# Patient Record
Sex: FEMALE | Race: WHITE | NOT HISPANIC OR LATINO
[De-identification: names, ages, dates, MRNs, and addresses within clinical notes are randomized per-mention and may not be internally consistent; named-entity substitution may affect disease eponyms.]

---

## 2017-02-02 ENCOUNTER — APPOINTMENT (OUTPATIENT)
Dept: ANTEPARTUM | Facility: CLINIC | Age: 37
End: 2017-02-02

## 2017-02-02 ENCOUNTER — ASOB RESULT (OUTPATIENT)
Age: 37
End: 2017-02-02

## 2017-06-07 ENCOUNTER — INPATIENT (INPATIENT)
Facility: HOSPITAL | Age: 37
LOS: 2 days | Discharge: HOME | End: 2017-06-10
Payer: COMMERCIAL

## 2017-06-07 DIAGNOSIS — Z3A.00 WEEKS OF GESTATION OF PREGNANCY NOT SPECIFIED: ICD-10-CM

## 2017-06-07 DIAGNOSIS — O26.899 OTHER SPECIFIED PREGNANCY RELATED CONDITIONS, UNSPECIFIED TRIMESTER: ICD-10-CM

## 2017-06-08 VITALS — HEIGHT: 65 IN | WEIGHT: 213.85 LBS

## 2017-06-08 DIAGNOSIS — Z34.80 ENCOUNTER FOR SUPERVISION OF OTHER NORMAL PREGNANCY, UNSPECIFIED TRIMESTER: ICD-10-CM

## 2017-06-08 LAB
ALBUMIN SERPL ELPH-MCNC: 2.9 G/DL — LOW (ref 3.3–5)
ALP SERPL-CCNC: 95 U/L — SIGNIFICANT CHANGE UP (ref 40–120)
ALT FLD-CCNC: 14 U/L RC — SIGNIFICANT CHANGE UP (ref 10–45)
ANION GAP SERPL CALC-SCNC: 18 MMOL/L — HIGH (ref 5–17)
APTT BLD: 23.4 SEC — LOW (ref 27.5–37.4)
AST SERPL-CCNC: 26 U/L — SIGNIFICANT CHANGE UP (ref 10–40)
BASOPHILS # BLD AUTO: 0 K/UL — SIGNIFICANT CHANGE UP (ref 0–0.2)
BASOPHILS # BLD AUTO: 0 K/UL — SIGNIFICANT CHANGE UP (ref 0–0.2)
BASOPHILS NFR BLD AUTO: 0.1 % — SIGNIFICANT CHANGE UP (ref 0–2)
BILIRUB SERPL-MCNC: 0.2 MG/DL — SIGNIFICANT CHANGE UP (ref 0.2–1.2)
BLD GP AB SCN SERPL QL: NEGATIVE — SIGNIFICANT CHANGE UP
BUN SERPL-MCNC: 7 MG/DL — SIGNIFICANT CHANGE UP (ref 7–23)
CALCIUM SERPL-MCNC: 8.4 MG/DL — SIGNIFICANT CHANGE UP (ref 8.4–10.5)
CHLORIDE SERPL-SCNC: 105 MMOL/L — SIGNIFICANT CHANGE UP (ref 96–108)
CO2 SERPL-SCNC: 16 MMOL/L — LOW (ref 22–31)
CREAT SERPL-MCNC: 0.68 MG/DL — SIGNIFICANT CHANGE UP (ref 0.5–1.3)
EOSINOPHIL # BLD AUTO: 0 K/UL — SIGNIFICANT CHANGE UP (ref 0–0.5)
EOSINOPHIL # BLD AUTO: 0 K/UL — SIGNIFICANT CHANGE UP (ref 0–0.5)
EOSINOPHIL NFR BLD AUTO: 0.1 % — SIGNIFICANT CHANGE UP (ref 0–6)
FIBRINOGEN PPP-MCNC: 584 MG/DL — HIGH (ref 310–510)
GLUCOSE SERPL-MCNC: 168 MG/DL — HIGH (ref 70–99)
HCT VFR BLD CALC: 29.3 % — LOW (ref 34.5–45)
HCT VFR BLD CALC: 38.2 % — SIGNIFICANT CHANGE UP (ref 34.5–45)
HGB BLD-MCNC: 10.1 G/DL — LOW (ref 11.5–15.5)
HGB BLD-MCNC: 13.1 G/DL — SIGNIFICANT CHANGE UP (ref 11.5–15.5)
INR BLD: 1.02 RATIO — SIGNIFICANT CHANGE UP (ref 0.88–1.16)
LDH SERPL L TO P-CCNC: 208 U/L — SIGNIFICANT CHANGE UP (ref 50–242)
LYMPHOCYTES # BLD AUTO: 1.3 K/UL — SIGNIFICANT CHANGE UP (ref 1–3.3)
LYMPHOCYTES # BLD AUTO: 1.4 K/UL — SIGNIFICANT CHANGE UP (ref 1–3.3)
LYMPHOCYTES # BLD AUTO: 5 % — LOW (ref 13–44)
LYMPHOCYTES # BLD AUTO: 8.3 % — LOW (ref 13–44)
MCHC RBC-ENTMCNC: 32 PG — SIGNIFICANT CHANGE UP (ref 27–34)
MCHC RBC-ENTMCNC: 32.7 PG — SIGNIFICANT CHANGE UP (ref 27–34)
MCHC RBC-ENTMCNC: 34.2 GM/DL — SIGNIFICANT CHANGE UP (ref 32–36)
MCHC RBC-ENTMCNC: 34.5 GM/DL — SIGNIFICANT CHANGE UP (ref 32–36)
MCV RBC AUTO: 93.6 FL — SIGNIFICANT CHANGE UP (ref 80–100)
MCV RBC AUTO: 94.9 FL — SIGNIFICANT CHANGE UP (ref 80–100)
MONOCYTES # BLD AUTO: 0.7 K/UL — SIGNIFICANT CHANGE UP (ref 0–0.9)
MONOCYTES # BLD AUTO: 1.2 K/UL — HIGH (ref 0–0.9)
MONOCYTES NFR BLD AUTO: 4.5 % — SIGNIFICANT CHANGE UP (ref 2–14)
MONOCYTES NFR BLD AUTO: 9 % — SIGNIFICANT CHANGE UP (ref 2–14)
NEUTROPHILS # BLD AUTO: 14 K/UL — HIGH (ref 1.8–7.4)
NEUTROPHILS # BLD AUTO: 20.4 K/UL — HIGH (ref 1.8–7.4)
NEUTROPHILS NFR BLD AUTO: 86 % — HIGH (ref 43–77)
NEUTROPHILS NFR BLD AUTO: 87 % — HIGH (ref 43–77)
PLATELET # BLD AUTO: 151 K/UL — SIGNIFICANT CHANGE UP (ref 150–400)
PLATELET # BLD AUTO: 178 K/UL — SIGNIFICANT CHANGE UP (ref 150–400)
POTASSIUM SERPL-MCNC: 3.1 MMOL/L — LOW (ref 3.5–5.3)
POTASSIUM SERPL-SCNC: 3.1 MMOL/L — LOW (ref 3.5–5.3)
PROT SERPL-MCNC: 5.1 G/DL — LOW (ref 6–8.3)
PROTHROM AB SERPL-ACNC: 11.1 SEC — SIGNIFICANT CHANGE UP (ref 9.8–12.7)
RBC # BLD: 3.09 M/UL — LOW (ref 3.8–5.2)
RBC # BLD: 4.09 M/UL — SIGNIFICANT CHANGE UP (ref 3.8–5.2)
RBC # FLD: 11.8 % — SIGNIFICANT CHANGE UP (ref 10.3–14.5)
RBC # FLD: 12.1 % — SIGNIFICANT CHANGE UP (ref 10.3–14.5)
RH IG SCN BLD-IMP: POSITIVE — SIGNIFICANT CHANGE UP
RH IG SCN BLD-IMP: POSITIVE — SIGNIFICANT CHANGE UP
SODIUM SERPL-SCNC: 139 MMOL/L — SIGNIFICANT CHANGE UP (ref 135–145)
T PALLIDUM AB TITR SER: NEGATIVE — SIGNIFICANT CHANGE UP
URATE SERPL-MCNC: 4.9 MG/DL — SIGNIFICANT CHANGE UP (ref 2.5–7)
WBC # BLD: 16.1 K/UL — HIGH (ref 3.8–10.5)
WBC # BLD: 23.1 K/UL — HIGH (ref 3.8–10.5)
WBC # FLD AUTO: 16.1 K/UL — HIGH (ref 3.8–10.5)
WBC # FLD AUTO: 23.1 K/UL — HIGH (ref 3.8–10.5)

## 2017-06-08 RX ORDER — GLYCERIN ADULT
1 SUPPOSITORY, RECTAL RECTAL AT BEDTIME
Qty: 0 | Refills: 0 | Status: DISCONTINUED | OUTPATIENT
Start: 2017-06-08 | End: 2017-06-10

## 2017-06-08 RX ORDER — OXYTOCIN 10 UNIT/ML
4 VIAL (ML) INJECTION
Qty: 30 | Refills: 0 | Status: DISCONTINUED | OUTPATIENT
Start: 2017-06-08 | End: 2017-06-08

## 2017-06-08 RX ORDER — SODIUM CHLORIDE 9 MG/ML
1000 INJECTION, SOLUTION INTRAVENOUS
Qty: 0 | Refills: 0 | Status: DISCONTINUED | OUTPATIENT
Start: 2017-06-08 | End: 2017-06-08

## 2017-06-08 RX ORDER — LANOLIN
1 OINTMENT (GRAM) TOPICAL EVERY 6 HOURS
Qty: 0 | Refills: 0 | Status: DISCONTINUED | OUTPATIENT
Start: 2017-06-08 | End: 2017-06-10

## 2017-06-08 RX ORDER — OXYCODONE HYDROCHLORIDE 5 MG/1
5 TABLET ORAL EVERY 4 HOURS
Qty: 0 | Refills: 0 | Status: DISCONTINUED | OUTPATIENT
Start: 2017-06-08 | End: 2017-06-10

## 2017-06-08 RX ORDER — HYDROCORTISONE 1 %
1 OINTMENT (GRAM) TOPICAL EVERY 4 HOURS
Qty: 0 | Refills: 0 | Status: DISCONTINUED | OUTPATIENT
Start: 2017-06-08 | End: 2017-06-10

## 2017-06-08 RX ORDER — IBUPROFEN 200 MG
600 TABLET ORAL EVERY 6 HOURS
Qty: 0 | Refills: 0 | Status: COMPLETED | OUTPATIENT
Start: 2017-06-08 | End: 2018-05-07

## 2017-06-08 RX ORDER — ACETAMINOPHEN 500 MG
650 TABLET ORAL EVERY 6 HOURS
Qty: 0 | Refills: 0 | Status: DISCONTINUED | OUTPATIENT
Start: 2017-06-08 | End: 2017-06-10

## 2017-06-08 RX ORDER — IBUPROFEN 200 MG
600 TABLET ORAL EVERY 6 HOURS
Qty: 0 | Refills: 0 | Status: DISCONTINUED | OUTPATIENT
Start: 2017-06-08 | End: 2017-06-10

## 2017-06-08 RX ORDER — KETOROLAC TROMETHAMINE 30 MG/ML
30 SYRINGE (ML) INJECTION ONCE
Qty: 0 | Refills: 0 | Status: DISCONTINUED | OUTPATIENT
Start: 2017-06-08 | End: 2017-06-08

## 2017-06-08 RX ORDER — OXYTOCIN 10 UNIT/ML
41.67 VIAL (ML) INJECTION
Qty: 20 | Refills: 0 | Status: DISCONTINUED | OUTPATIENT
Start: 2017-06-08 | End: 2017-06-10

## 2017-06-08 RX ORDER — PRAMOXINE HYDROCHLORIDE 150 MG/15G
1 AEROSOL, FOAM RECTAL EVERY 4 HOURS
Qty: 0 | Refills: 0 | Status: DISCONTINUED | OUTPATIENT
Start: 2017-06-08 | End: 2017-06-10

## 2017-06-08 RX ORDER — OXYTOCIN 10 UNIT/ML
333.33 VIAL (ML) INJECTION
Qty: 20 | Refills: 0 | Status: DISCONTINUED | OUTPATIENT
Start: 2017-06-08 | End: 2017-06-08

## 2017-06-08 RX ORDER — DOCUSATE SODIUM 100 MG
100 CAPSULE ORAL
Qty: 0 | Refills: 0 | Status: DISCONTINUED | OUTPATIENT
Start: 2017-06-08 | End: 2017-06-10

## 2017-06-08 RX ORDER — DIBUCAINE 1 %
1 OINTMENT (GRAM) RECTAL EVERY 4 HOURS
Qty: 0 | Refills: 0 | Status: DISCONTINUED | OUTPATIENT
Start: 2017-06-08 | End: 2017-06-10

## 2017-06-08 RX ORDER — SODIUM CHLORIDE 9 MG/ML
500 INJECTION, SOLUTION INTRAVENOUS ONCE
Qty: 0 | Refills: 0 | Status: COMPLETED | OUTPATIENT
Start: 2017-06-08 | End: 2017-06-08

## 2017-06-08 RX ORDER — MAGNESIUM HYDROXIDE 400 MG/1
30 TABLET, CHEWABLE ORAL
Qty: 0 | Refills: 0 | Status: DISCONTINUED | OUTPATIENT
Start: 2017-06-08 | End: 2017-06-10

## 2017-06-08 RX ORDER — ACETAMINOPHEN 500 MG
650 TABLET ORAL EVERY 6 HOURS
Qty: 0 | Refills: 0 | Status: DISCONTINUED | OUTPATIENT
Start: 2017-06-08 | End: 2017-06-08

## 2017-06-08 RX ORDER — AER TRAVELER 0.5 G/1
1 SOLUTION RECTAL; TOPICAL EVERY 4 HOURS
Qty: 0 | Refills: 0 | Status: DISCONTINUED | OUTPATIENT
Start: 2017-06-08 | End: 2017-06-10

## 2017-06-08 RX ORDER — CITRIC ACID/SODIUM CITRATE 300-500 MG
15 SOLUTION, ORAL ORAL EVERY 4 HOURS
Qty: 0 | Refills: 0 | Status: DISCONTINUED | OUTPATIENT
Start: 2017-06-08 | End: 2017-06-08

## 2017-06-08 RX ORDER — SODIUM CHLORIDE 9 MG/ML
3 INJECTION INTRAMUSCULAR; INTRAVENOUS; SUBCUTANEOUS EVERY 8 HOURS
Qty: 0 | Refills: 0 | Status: DISCONTINUED | OUTPATIENT
Start: 2017-06-08 | End: 2017-06-10

## 2017-06-08 RX ORDER — OXYTOCIN 10 UNIT/ML
4 VIAL (ML) INJECTION
Qty: 30 | Refills: 0 | Status: DISCONTINUED | OUTPATIENT
Start: 2017-06-08 | End: 2017-06-10

## 2017-06-08 RX ORDER — SIMETHICONE 80 MG/1
80 TABLET, CHEWABLE ORAL EVERY 6 HOURS
Qty: 0 | Refills: 0 | Status: DISCONTINUED | OUTPATIENT
Start: 2017-06-08 | End: 2017-06-10

## 2017-06-08 RX ORDER — DIPHENHYDRAMINE HCL 50 MG
25 CAPSULE ORAL EVERY 6 HOURS
Qty: 0 | Refills: 0 | Status: DISCONTINUED | OUTPATIENT
Start: 2017-06-08 | End: 2017-06-10

## 2017-06-08 RX ORDER — OXYCODONE HYDROCHLORIDE 5 MG/1
5 TABLET ORAL
Qty: 0 | Refills: 0 | Status: DISCONTINUED | OUTPATIENT
Start: 2017-06-08 | End: 2017-06-10

## 2017-06-08 RX ORDER — ACETAMINOPHEN 500 MG
975 TABLET ORAL EVERY 6 HOURS
Qty: 0 | Refills: 0 | Status: COMPLETED | OUTPATIENT
Start: 2017-06-08 | End: 2018-05-07

## 2017-06-08 RX ORDER — TETANUS TOXOID, REDUCED DIPHTHERIA TOXOID AND ACELLULAR PERTUSSIS VACCINE, ADSORBED 5; 2.5; 8; 8; 2.5 [IU]/.5ML; [IU]/.5ML; UG/.5ML; UG/.5ML; UG/.5ML
0.5 SUSPENSION INTRAMUSCULAR ONCE
Qty: 0 | Refills: 0 | Status: COMPLETED | OUTPATIENT
Start: 2017-06-08

## 2017-06-08 RX ADMIN — Medication 4 MILLIUNIT(S)/MIN: at 08:10

## 2017-06-08 RX ADMIN — Medication 4 MILLIUNIT(S)/MIN: at 17:17

## 2017-06-08 RX ADMIN — Medication 125 MILLIUNIT(S)/MIN: at 17:06

## 2017-06-08 RX ADMIN — Medication 15 MILLILITER(S): at 13:24

## 2017-06-08 RX ADMIN — Medication 15 MILLILITER(S): at 03:24

## 2017-06-08 RX ADMIN — SODIUM CHLORIDE 1000 MILLILITER(S): 9 INJECTION, SOLUTION INTRAVENOUS at 01:53

## 2017-06-08 RX ADMIN — Medication 30 MILLIGRAM(S): at 18:54

## 2017-06-08 RX ADMIN — SODIUM CHLORIDE 125 MILLILITER(S): 9 INJECTION, SOLUTION INTRAVENOUS at 02:53

## 2017-06-08 NOTE — BRIEF OPERATIVE NOTE - POST-OP DX
DUB (dysfunctional uterine bleeding)  06/08/2017    Active  Krystyna Bailey  Uterine leiomyoma, unspecified location  06/08/2017    Active  Krystyna Bailey

## 2017-06-09 LAB
HCT VFR BLD CALC: 27.6 % — LOW (ref 34.5–45)
HGB BLD-MCNC: 9.4 G/DL — LOW (ref 11.5–15.5)

## 2017-06-09 RX ORDER — ASCORBIC ACID 60 MG
250 TABLET,CHEWABLE ORAL THREE TIMES A DAY
Qty: 0 | Refills: 0 | Status: DISCONTINUED | OUTPATIENT
Start: 2017-06-09 | End: 2017-06-10

## 2017-06-09 RX ORDER — TETANUS TOXOID, REDUCED DIPHTHERIA TOXOID AND ACELLULAR PERTUSSIS VACCINE, ADSORBED 5; 2.5; 8; 8; 2.5 [IU]/.5ML; [IU]/.5ML; UG/.5ML; UG/.5ML; UG/.5ML
0.5 SUSPENSION INTRAMUSCULAR ONCE
Qty: 0 | Refills: 0 | Status: COMPLETED | OUTPATIENT
Start: 2017-06-09 | End: 2017-06-09

## 2017-06-09 RX ORDER — ASCORBIC ACID 60 MG
1 TABLET,CHEWABLE ORAL
Qty: 0 | Refills: 0 | COMMUNITY
Start: 2017-06-09

## 2017-06-09 RX ORDER — ACETAMINOPHEN 500 MG
2 TABLET ORAL
Qty: 0 | Refills: 0 | COMMUNITY
Start: 2017-06-09

## 2017-06-09 RX ORDER — DOCUSATE SODIUM 100 MG
1 CAPSULE ORAL
Qty: 0 | Refills: 0 | COMMUNITY
Start: 2017-06-09

## 2017-06-09 RX ORDER — MAGNESIUM HYDROXIDE 400 MG/1
30 TABLET, CHEWABLE ORAL
Qty: 0 | Refills: 0 | COMMUNITY
Start: 2017-06-09

## 2017-06-09 RX ORDER — ACETAMINOPHEN 500 MG
975 TABLET ORAL EVERY 6 HOURS
Qty: 0 | Refills: 0 | Status: DISCONTINUED | OUTPATIENT
Start: 2017-06-09 | End: 2017-06-10

## 2017-06-09 RX ORDER — IBUPROFEN 200 MG
1 TABLET ORAL
Qty: 80 | Refills: 0 | OUTPATIENT
Start: 2017-06-09 | End: 2017-06-29

## 2017-06-09 RX ORDER — DOCUSATE SODIUM 100 MG
100 CAPSULE ORAL THREE TIMES A DAY
Qty: 0 | Refills: 0 | Status: DISCONTINUED | OUTPATIENT
Start: 2017-06-09 | End: 2017-06-10

## 2017-06-09 RX ORDER — OXYCODONE HYDROCHLORIDE 5 MG/1
1 TABLET ORAL
Qty: 16 | Refills: 0 | OUTPATIENT
Start: 2017-06-09 | End: 2017-06-13

## 2017-06-09 RX ORDER — LANOLIN
1 OINTMENT (GRAM) TOPICAL
Qty: 0 | Refills: 0 | COMMUNITY
Start: 2017-06-09

## 2017-06-09 RX ORDER — FERROUS SULFATE 325(65) MG
325 TABLET ORAL
Qty: 0 | Refills: 0 | Status: DISCONTINUED | OUTPATIENT
Start: 2017-06-09 | End: 2017-06-10

## 2017-06-09 RX ADMIN — Medication 325 MILLIGRAM(S): at 14:10

## 2017-06-09 RX ADMIN — Medication 100 MILLIGRAM(S): at 18:46

## 2017-06-09 RX ADMIN — Medication 600 MILLIGRAM(S): at 20:05

## 2017-06-09 RX ADMIN — Medication 250 MILLIGRAM(S): at 14:10

## 2017-06-09 RX ADMIN — Medication 600 MILLIGRAM(S): at 14:10

## 2017-06-09 RX ADMIN — Medication 600 MILLIGRAM(S): at 15:00

## 2017-06-09 RX ADMIN — Medication 600 MILLIGRAM(S): at 21:05

## 2017-06-09 RX ADMIN — Medication 600 MILLIGRAM(S): at 05:26

## 2017-06-09 RX ADMIN — Medication 600 MILLIGRAM(S): at 06:00

## 2017-06-09 RX ADMIN — TETANUS TOXOID, REDUCED DIPHTHERIA TOXOID AND ACELLULAR PERTUSSIS VACCINE, ADSORBED 0.5 MILLILITER(S): 5; 2.5; 8; 8; 2.5 SUSPENSION INTRAMUSCULAR at 20:52

## 2017-06-09 RX ADMIN — Medication 100 MILLIGRAM(S): at 14:10

## 2017-06-09 RX ADMIN — Medication 325 MILLIGRAM(S): at 18:46

## 2017-06-09 RX ADMIN — Medication 250 MILLIGRAM(S): at 20:04

## 2017-06-09 NOTE — DISCHARGE NOTE OB - CARE PROVIDER_API CALL
Krystyna Bailey), Obstetrics and Gynecology  [address] [place] [address]  Phone: [phone]  Fax: [phone]

## 2017-06-09 NOTE — PROGRESS NOTE ADULT - SUBJECTIVE AND OBJECTIVE BOX
S:TORRIE BARKSDALE is a 36y Female status post vaginal delivery.       Supervision of other normal pregnancy, antepartum  No h/o HF  MEWS Score      O:T(C): 36.7, Max: 36.9 (06-08 @ 20:40)  HR: 88 (76 - 117)  BP: 119/81 (112/63 - 145/85)  RR: 16 (15 - 18)  SpO2: 100% (97% - 100%)  Wt(kg): --                 CBC Full  -  ( 09 Jun 2017 05:35 )  WBC Count : x  Hemoglobin : 9.4 g/dL  Hematocrit : 27.6 %  Platelet Count - Automated : x  Mean Cell Volume : x  Mean Cell Hemoglobin : x  Mean Cell Hemoglobin Concentration : x  Auto Neutrophil # : x  Auto Lymphocyte # : x  Auto Monocyte # : x  Auto Eosinophil # : x  Auto Basophil # : x  Auto Neutrophil % : x  Auto Lymphocyte % : x  Auto Monocyte % : x  Auto Eosinophil % : x  Auto Basophil % : x                    Blood type:  ABO Interpretation: B (06-08 @ 04:14)  ABO Interpretation: B (06-08 @ 02:03)        Rh Interpretation: Positive (06-08 @ 04:14)  Rh Interpretation: Positive (06-08 @ 02:03)                         Rubella: Immune            General: alert and oriented        Breast:  soft, nontender,         Abdomin:  soft nontender, BS+        Fundus:  firm, nontender, below the umbilicus        Extremities:  no edema, no cyanosis, normal reflexes        Lochia:  mild                  A:  Stable postpartum on Day 1.        Pt complains of mild discomfort in the perinium.      P:  Routine postpartum care. I reviewed pain medications with her-ibuprofen 600 mg every 6 hours/tylenol 650mg four hours after         ibuprofen dose to cover breakthrough pain.

## 2017-06-09 NOTE — PROGRESS NOTE ADULT - SUBJECTIVE AND OBJECTIVE BOX
PA NOTE     Day 1      Vital Signs Last 24 Hrs  T(C): 36.7, Max: 37.4 ( @ 17:55)  T(F): 98.1, Max: 99.3 ( @ 17:55)  HR: 86 (81 - 121)  BP: 125/82 (112/63 - 145/85)  BP(mean): --  RR: 18 (15 - 18)  SpO2: 100% (97% - 100%)                          9.4    x     )-----------( x        ( 2017 05:35 )             27.6     9.4/27.6  10.1/29.3      Plan:  - Ferrous Sulfate, Colace, Vitamin C supplementation.  - Monitor for signs/symptoms of anemia.

## 2017-06-09 NOTE — DISCHARGE NOTE OB - MEDICATION SUMMARY - MEDICATIONS TO TAKE
I will START or STAY ON the medications listed below when I get home from the hospital:    ferrous sulfate 325 mg  -- 1 tab(s) by mouth once a day (at bedtime)  -- Indication: For iron supplement    acetaminophen 325 mg oral tablet  -- 2 tab(s) by mouth every 6 hours, As needed, Mild Pain  -- Indication: For Pain    acetaminophen-oxycodone 325 mg-5 mg oral tablet  -- 1 tab(s) by mouth every 6 hours, As Needed, Severe Pain MDD:4 tabs/24 h  -- Indication: For Sever pain    ibuprofen 600 mg oral tablet  -- 1 tab(s) by mouth every 6 hours, As needed, Moderate pain or cramping  -- Indication: For Pain    magnesium hydroxide 8% oral suspension  -- 30 milliliter(s) by mouth 2 times a day, As needed, Constipation  -- Indication: For constipation    lanolin topical ointment  -- 1 application on skin every 6 hours, As needed, Sore Nipples  -- Indication: For Sore nipples    Prenatal Multivitamins with Folic Acid 1 mg oral tablet  -- 1 tab(s) by mouth once a day  -- Indication: For vitamins    docusate sodium 100 mg oral capsule  -- 1 cap(s) by mouth 2 times a day, As needed, Stool Softening  -- Indication: For Stool softners    ascorbic acid 250 mg oral tablet  -- 1 tab(s) by mouth 3 times a day  -- Indication: For vitamin

## 2017-06-09 NOTE — DISCHARGE NOTE OB - HOSPITAL COURSE
35 y/o W/F  at term who presented to L+D with contraction and in labor. Pt progressed in labor and delivered a viable baby boy vaginally.   she has had a benign postpartum course.

## 2017-06-09 NOTE — DISCHARGE NOTE OB - CARE PLAN
Principal Discharge DX:	40 weeks gestation of pregnancy  Goal:	Safe delivery of patient  Instructions for follow-up, activity and diet:	Nothing in the vagina, no tampons, douches, baths, swimming or sex for 6-8 weeks.  Regular diet, follow up visit in 3 weeks

## 2017-06-09 NOTE — DISCHARGE NOTE OB - PATIENT PORTAL LINK FT
“You can access the [facility] Patient Portal, offered by [facility], by registering with the following website: [email or web address]”

## 2017-06-09 NOTE — PROGRESS NOTE ADULT - SUBJECTIVE AND OBJECTIVE BOX
Postpartum Note- PPD#1    Allergies    penicillin (Rash)    S:Patient is a  36y         PPD#1    S/P       Patient w/o complaints, pain is controlled.    Pt is OOB, tolerating PO, passing flatus. Lochia WNL. breastfeeding    O:  Vital Signs Last 24 Hrs  T(C): 36.7, Max: 37.4 ( @ 17:55)  T(F): 98.1, Max: 99.3 ( @ 17:55)  HR: 86 (81 - 121)  BP: 125/82 (112/63 - 145/85)  BP(mean): --  RR: 18 (15 - 18)  SpO2: 100% (97% - 100%)     Gen: NAD  Abdomen: Soft, nontender, non-distended, fundus firm.  Lochia WNL, midline episiotomy with partial 3rd degree laceration   Ext: Neg edema, Neg calf tenderness    LABS:    Hemoglobin: 9.4 g/dL ( @ 05:35)  Hemoglobin: 10.1 g/dL ( @ 22:07)  Hemoglobin: 13.1 g/dL ( @ 02:01)      Hematocrit: 27.6 % ( @ 05:35)  Hematocrit: 29.3 % ( @ 22:07)  Hematocrit: 38.2 % ( @ 02:01)      A/P:  36y  PPD # 1      S/P     doing well    PMHx:  Current Issues: none    Increase OOB  Regular diet  PO Pain protocol  AM H&H  Routine Postpartum Care

## 2017-06-09 NOTE — DISCHARGE NOTE OB - MATERIALS PROVIDED
Back To Sleep Handout/[facility]  Screening Program/Breastfeeding Guide and Packet/Breastfeeding Log/Breastfeeding Mother’s Support Group Information/Guide to Postpartum Care/Shaken Baby Prevention Handout/  Immunization Record/Vaccinations/[facility] Hearing Screen Program/Birth Certificate Instructions

## 2017-06-10 VITALS
HEART RATE: 71 BPM | RESPIRATION RATE: 18 BRPM | TEMPERATURE: 98 F | SYSTOLIC BLOOD PRESSURE: 130 MMHG | DIASTOLIC BLOOD PRESSURE: 80 MMHG

## 2017-06-10 PROCEDURE — 86850 RBC ANTIBODY SCREEN: CPT

## 2017-06-10 PROCEDURE — 85027 COMPLETE CBC AUTOMATED: CPT

## 2017-06-10 PROCEDURE — 85384 FIBRINOGEN ACTIVITY: CPT

## 2017-06-10 PROCEDURE — 90715 TDAP VACCINE 7 YRS/> IM: CPT

## 2017-06-10 PROCEDURE — 83615 LACTATE (LD) (LDH) ENZYME: CPT

## 2017-06-10 PROCEDURE — 86900 BLOOD TYPING SEROLOGIC ABO: CPT

## 2017-06-10 PROCEDURE — 59025 FETAL NON-STRESS TEST: CPT

## 2017-06-10 PROCEDURE — 85610 PROTHROMBIN TIME: CPT

## 2017-06-10 PROCEDURE — 86780 TREPONEMA PALLIDUM: CPT

## 2017-06-10 PROCEDURE — G0463: CPT

## 2017-06-10 PROCEDURE — 86901 BLOOD TYPING SEROLOGIC RH(D): CPT

## 2017-06-10 PROCEDURE — 59050 FETAL MONITOR W/REPORT: CPT

## 2017-06-10 PROCEDURE — 80053 COMPREHEN METABOLIC PANEL: CPT

## 2017-06-10 PROCEDURE — 84550 ASSAY OF BLOOD/URIC ACID: CPT

## 2017-06-10 PROCEDURE — 85018 HEMOGLOBIN: CPT

## 2017-06-10 PROCEDURE — 85730 THROMBOPLASTIN TIME PARTIAL: CPT

## 2017-06-10 RX ORDER — IBUPROFEN 200 MG
600 TABLET ORAL EVERY 6 HOURS
Qty: 0 | Refills: 0 | Status: DISCONTINUED | OUTPATIENT
Start: 2017-06-10 | End: 2017-06-10

## 2017-06-10 RX ADMIN — Medication 600 MILLIGRAM(S): at 06:15

## 2017-06-10 RX ADMIN — Medication 600 MILLIGRAM(S): at 05:15

## 2017-06-10 RX ADMIN — Medication 1 TABLET(S): at 13:18

## 2017-06-10 RX ADMIN — Medication 600 MILLIGRAM(S): at 18:53

## 2017-06-10 RX ADMIN — Medication 325 MILLIGRAM(S): at 13:18

## 2017-06-10 RX ADMIN — Medication 250 MILLIGRAM(S): at 05:14

## 2017-06-10 RX ADMIN — Medication 100 MILLIGRAM(S): at 05:14

## 2017-06-10 RX ADMIN — Medication 600 MILLIGRAM(S): at 13:18

## 2017-06-10 RX ADMIN — Medication 600 MILLIGRAM(S): at 14:00

## 2017-06-10 RX ADMIN — Medication 100 MILLIGRAM(S): at 13:18

## 2017-06-10 RX ADMIN — Medication 250 MILLIGRAM(S): at 13:18

## 2017-06-10 NOTE — PROGRESS NOTE ADULT - SUBJECTIVE AND OBJECTIVE BOX
S:  TORRIE BARKSDALE is a 36y Female [facility] vaginal delivery Day 2.        She is comfortable and offers no compliants.    O:  T(C): 36.4, Max: 36.7 (06-09 @ 13:00)  HR: 71 (71 - 88)  BP: 130/80 (119/81 - 130/80)  RR: 18 (16 - 18)  SpO2: --  Wt(kg): --                                 9.4    x     )-----------( x        ( 09 Jun 2017 05:35 )             27.6               Blood type:  Blood Typing (ABO + Rho D) (06.08.17 @ 04:14)    Rh Interpretation: Positive    ABO Interpretation: B                                Rubella:  Immune               General: alert and oriented           Breast:  soft, nontender,            Abdomin:  soft nontender, BS+, Flatus(+), BM(+)           Fundus:  firm, nontender, below the umbilicus           Extremities:  no edema, no cyanosis, normal reflexes           Lochia:  mild    A:  Stable, Postpartum    P:   TORRIE BARKSDALE will be discharged home today. She is given instructions:                            1. nothing per vagina-no tampons, douches, baths, swiming or sex for 6-8 weeks             2.  to take ibuprofen 600 mg every 6 hours for pain or cramps. (Can take two Tylenol 325mg tablets every 6 hours as an                    alternative pain medication-NO MORE THAN 4000mg of Tylenol over 24hours)                       3.  Call the office and make postpartum visit for 3 weeks; sooner if bleeding becomes heavier, or she has feelings of                  depression or anxiety or develops a fever greater than 100.4 F.                 To use abdominal binder while awake as needed                 Verbal discharge instructions d/w patient  - discharge summary to be  given to her on discharge for the hospital             4.  No driving x 2 weeks.  TORRIE BARKSDALE is not to drive if taking narcotics             5.  Continue taking prenatal vitamins

## 2018-07-24 ENCOUNTER — APPOINTMENT (OUTPATIENT)
Dept: ANTEPARTUM | Facility: CLINIC | Age: 38
End: 2018-07-24

## 2018-07-24 ENCOUNTER — APPOINTMENT (OUTPATIENT)
Dept: ANTEPARTUM | Facility: CLINIC | Age: 38
End: 2018-07-24
Payer: COMMERCIAL

## 2018-07-24 ENCOUNTER — ASOB RESULT (OUTPATIENT)
Age: 38
End: 2018-07-24

## 2018-07-24 PROCEDURE — 76801 OB US < 14 WKS SINGLE FETUS: CPT

## 2018-07-24 PROCEDURE — 76814 OB US NUCHAL MEAS ADD-ON: CPT

## 2018-07-24 PROCEDURE — 76802 OB US < 14 WKS ADDL FETUS: CPT

## 2018-07-24 PROCEDURE — 76813 OB US NUCHAL MEAS 1 GEST: CPT

## 2018-07-24 PROCEDURE — 36416 COLLJ CAPILLARY BLOOD SPEC: CPT

## 2018-08-20 ENCOUNTER — APPOINTMENT (OUTPATIENT)
Dept: ANTEPARTUM | Facility: CLINIC | Age: 38
End: 2018-08-20
Payer: COMMERCIAL

## 2018-08-20 ENCOUNTER — ASOB RESULT (OUTPATIENT)
Age: 38
End: 2018-08-20

## 2018-08-20 PROCEDURE — 76805 OB US >/= 14 WKS SNGL FETUS: CPT

## 2018-08-20 PROCEDURE — 76810 OB US >/= 14 WKS ADDL FETUS: CPT

## 2018-09-19 ENCOUNTER — APPOINTMENT (OUTPATIENT)
Dept: ANTEPARTUM | Facility: CLINIC | Age: 38
End: 2018-09-19
Payer: COMMERCIAL

## 2018-09-19 ENCOUNTER — ASOB RESULT (OUTPATIENT)
Age: 38
End: 2018-09-19

## 2018-09-19 PROCEDURE — 76812 OB US DETAILED ADDL FETUS: CPT

## 2018-09-19 PROCEDURE — 76811 OB US DETAILED SNGL FETUS: CPT

## 2018-09-19 PROCEDURE — 76817 TRANSVAGINAL US OBSTETRIC: CPT

## 2018-10-03 ENCOUNTER — ASOB RESULT (OUTPATIENT)
Age: 38
End: 2018-10-03

## 2018-10-03 ENCOUNTER — APPOINTMENT (OUTPATIENT)
Dept: ANTEPARTUM | Facility: CLINIC | Age: 38
End: 2018-10-03
Payer: COMMERCIAL

## 2018-10-03 PROCEDURE — 76816 OB US FOLLOW-UP PER FETUS: CPT

## 2018-10-25 ENCOUNTER — APPOINTMENT (OUTPATIENT)
Dept: ANTEPARTUM | Facility: CLINIC | Age: 38
End: 2018-10-25
Payer: COMMERCIAL

## 2018-10-25 ENCOUNTER — ASOB RESULT (OUTPATIENT)
Age: 38
End: 2018-10-25

## 2018-10-25 PROCEDURE — 76816 OB US FOLLOW-UP PER FETUS: CPT | Mod: 59

## 2018-11-29 ENCOUNTER — ASOB RESULT (OUTPATIENT)
Age: 38
End: 2018-11-29

## 2018-11-29 ENCOUNTER — APPOINTMENT (OUTPATIENT)
Dept: ANTEPARTUM | Facility: CLINIC | Age: 38
End: 2018-11-29
Payer: COMMERCIAL

## 2018-11-29 PROCEDURE — 76816 OB US FOLLOW-UP PER FETUS: CPT

## 2018-12-26 ENCOUNTER — ASOB RESULT (OUTPATIENT)
Age: 38
End: 2018-12-26

## 2018-12-26 ENCOUNTER — APPOINTMENT (OUTPATIENT)
Dept: ANTEPARTUM | Facility: CLINIC | Age: 38
End: 2018-12-26
Payer: COMMERCIAL

## 2018-12-26 PROCEDURE — 76819 FETAL BIOPHYS PROFIL W/O NST: CPT | Mod: 59

## 2018-12-26 PROCEDURE — 76816 OB US FOLLOW-UP PER FETUS: CPT

## 2019-01-10 ENCOUNTER — APPOINTMENT (OUTPATIENT)
Dept: ANTEPARTUM | Facility: CLINIC | Age: 39
End: 2019-01-10

## 2019-01-14 ENCOUNTER — TRANSCRIPTION ENCOUNTER (OUTPATIENT)
Age: 39
End: 2019-01-14

## 2019-01-14 ENCOUNTER — INPATIENT (INPATIENT)
Facility: HOSPITAL | Age: 39
LOS: 7 days | Discharge: HOME | End: 2019-01-22
Payer: COMMERCIAL

## 2019-01-14 ENCOUNTER — APPOINTMENT (OUTPATIENT)
Dept: ANTEPARTUM | Facility: CLINIC | Age: 39
End: 2019-01-14
Payer: COMMERCIAL

## 2019-01-14 ENCOUNTER — RESULT REVIEW (OUTPATIENT)
Age: 39
End: 2019-01-14

## 2019-01-14 ENCOUNTER — ASOB RESULT (OUTPATIENT)
Age: 39
End: 2019-01-14

## 2019-01-14 VITALS — HEIGHT: 64 IN | WEIGHT: 218.26 LBS

## 2019-01-14 DIAGNOSIS — O26.899 OTHER SPECIFIED PREGNANCY RELATED CONDITIONS, UNSPECIFIED TRIMESTER: ICD-10-CM

## 2019-01-14 DIAGNOSIS — Z3A.00 WEEKS OF GESTATION OF PREGNANCY NOT SPECIFIED: ICD-10-CM

## 2019-01-14 DIAGNOSIS — Z34.80 ENCOUNTER FOR SUPERVISION OF OTHER NORMAL PREGNANCY, UNSPECIFIED TRIMESTER: ICD-10-CM

## 2019-01-14 LAB
ALBUMIN SERPL ELPH-MCNC: 3.3 G/DL — SIGNIFICANT CHANGE UP (ref 3.3–5)
ALP SERPL-CCNC: 168 U/L — HIGH (ref 40–120)
ALT FLD-CCNC: 13 U/L — SIGNIFICANT CHANGE UP (ref 10–45)
ANION GAP SERPL CALC-SCNC: 14 MMOL/L — SIGNIFICANT CHANGE UP (ref 5–17)
APTT BLD: 23.4 SEC — LOW (ref 27.5–36.3)
AST SERPL-CCNC: 18 U/L — SIGNIFICANT CHANGE UP (ref 10–40)
BASOPHILS # BLD AUTO: 0 K/UL — SIGNIFICANT CHANGE UP (ref 0–0.2)
BASOPHILS NFR BLD AUTO: 0.3 % — SIGNIFICANT CHANGE UP (ref 0–2)
BILIRUB SERPL-MCNC: 0.2 MG/DL — SIGNIFICANT CHANGE UP (ref 0.2–1.2)
BLD GP AB SCN SERPL QL: NEGATIVE — SIGNIFICANT CHANGE UP
BUN SERPL-MCNC: 6 MG/DL — LOW (ref 7–23)
CALCIUM SERPL-MCNC: 8.7 MG/DL — SIGNIFICANT CHANGE UP (ref 8.4–10.5)
CHLORIDE SERPL-SCNC: 104 MMOL/L — SIGNIFICANT CHANGE UP (ref 96–108)
CO2 SERPL-SCNC: 19 MMOL/L — LOW (ref 22–31)
CREAT SERPL-MCNC: 0.59 MG/DL — SIGNIFICANT CHANGE UP (ref 0.5–1.3)
EOSINOPHIL # BLD AUTO: 0.1 K/UL — SIGNIFICANT CHANGE UP (ref 0–0.5)
EOSINOPHIL NFR BLD AUTO: 0.6 % — SIGNIFICANT CHANGE UP (ref 0–6)
FIBRINOGEN PPP-MCNC: 838 MG/DL — HIGH (ref 350–510)
GLUCOSE SERPL-MCNC: 69 MG/DL — LOW (ref 70–99)
HCT VFR BLD CALC: 26.6 % — LOW (ref 34.5–45)
HGB BLD-MCNC: 9.2 G/DL — LOW (ref 11.5–15.5)
INR BLD: 0.91 RATIO — SIGNIFICANT CHANGE UP (ref 0.88–1.16)
LDH SERPL L TO P-CCNC: 193 U/L — SIGNIFICANT CHANGE UP (ref 50–242)
LYMPHOCYTES # BLD AUTO: 1.8 K/UL — SIGNIFICANT CHANGE UP (ref 1–3.3)
LYMPHOCYTES # BLD AUTO: 20.6 % — SIGNIFICANT CHANGE UP (ref 13–44)
MAGNESIUM SERPL-MCNC: 3.8 MG/DL — HIGH (ref 1.6–2.6)
MCHC RBC-ENTMCNC: 30 PG — SIGNIFICANT CHANGE UP (ref 27–34)
MCHC RBC-ENTMCNC: 34.7 GM/DL — SIGNIFICANT CHANGE UP (ref 32–36)
MCV RBC AUTO: 86.4 FL — SIGNIFICANT CHANGE UP (ref 80–100)
MONOCYTES # BLD AUTO: 0.7 K/UL — SIGNIFICANT CHANGE UP (ref 0–0.9)
MONOCYTES NFR BLD AUTO: 7.4 % — SIGNIFICANT CHANGE UP (ref 2–14)
NEUTROPHILS # BLD AUTO: 6.4 K/UL — SIGNIFICANT CHANGE UP (ref 1.8–7.4)
NEUTROPHILS NFR BLD AUTO: 71.1 % — SIGNIFICANT CHANGE UP (ref 43–77)
PLATELET # BLD AUTO: 149 K/UL — LOW (ref 150–400)
POTASSIUM SERPL-MCNC: 3.4 MMOL/L — LOW (ref 3.5–5.3)
POTASSIUM SERPL-SCNC: 3.4 MMOL/L — LOW (ref 3.5–5.3)
PROT SERPL-MCNC: 5.9 G/DL — LOW (ref 6–8.3)
PROTHROM AB SERPL-ACNC: 10.4 SEC — SIGNIFICANT CHANGE UP (ref 10–12.9)
RBC # BLD: 3.08 M/UL — LOW (ref 3.8–5.2)
RBC # FLD: 14.7 % — HIGH (ref 10.3–14.5)
RH IG SCN BLD-IMP: POSITIVE — SIGNIFICANT CHANGE UP
SODIUM SERPL-SCNC: 137 MMOL/L — SIGNIFICANT CHANGE UP (ref 135–145)
T PALLIDUM AB TITR SER: NEGATIVE — SIGNIFICANT CHANGE UP
URATE SERPL-MCNC: 4.6 MG/DL — SIGNIFICANT CHANGE UP (ref 2.5–7)
WBC # BLD: 9 K/UL — SIGNIFICANT CHANGE UP (ref 3.8–10.5)
WBC # FLD AUTO: 9 K/UL — SIGNIFICANT CHANGE UP (ref 3.8–10.5)

## 2019-01-14 PROCEDURE — 76819 FETAL BIOPHYS PROFIL W/O NST: CPT | Mod: 26,59

## 2019-01-14 RX ORDER — SODIUM CHLORIDE 9 MG/ML
1000 INJECTION, SOLUTION INTRAVENOUS
Qty: 0 | Refills: 0 | Status: DISCONTINUED | OUTPATIENT
Start: 2019-01-14 | End: 2019-01-15

## 2019-01-14 RX ORDER — LABETALOL HCL 100 MG
20 TABLET ORAL ONCE
Qty: 0 | Refills: 0 | Status: COMPLETED | OUTPATIENT
Start: 2019-01-14 | End: 2019-01-14

## 2019-01-14 RX ORDER — OXYTOCIN 10 UNIT/ML
333.33 VIAL (ML) INJECTION
Qty: 20 | Refills: 0 | Status: DISCONTINUED | OUTPATIENT
Start: 2019-01-14 | End: 2019-01-15

## 2019-01-14 RX ORDER — LABETALOL HCL 100 MG
200 TABLET ORAL THREE TIMES A DAY
Qty: 0 | Refills: 0 | Status: DISCONTINUED | OUTPATIENT
Start: 2019-01-14 | End: 2019-01-17

## 2019-01-14 RX ORDER — MAGNESIUM SULFATE 500 MG/ML
4 VIAL (ML) INJECTION ONCE
Qty: 0 | Refills: 0 | Status: COMPLETED | OUTPATIENT
Start: 2019-01-14 | End: 2019-01-14

## 2019-01-14 RX ORDER — ACETAMINOPHEN 500 MG
975 TABLET ORAL EVERY 6 HOURS
Qty: 0 | Refills: 0 | Status: DISCONTINUED | OUTPATIENT
Start: 2019-01-14 | End: 2019-01-22

## 2019-01-14 RX ORDER — CITRIC ACID/SODIUM CITRATE 300-500 MG
15 SOLUTION, ORAL ORAL EVERY 4 HOURS
Qty: 0 | Refills: 0 | Status: DISCONTINUED | OUTPATIENT
Start: 2019-01-14 | End: 2019-01-15

## 2019-01-14 RX ORDER — SODIUM CHLORIDE 9 MG/ML
250 INJECTION, SOLUTION INTRAVENOUS ONCE
Qty: 0 | Refills: 0 | Status: DISCONTINUED | OUTPATIENT
Start: 2019-01-14 | End: 2019-01-15

## 2019-01-14 RX ORDER — SODIUM CHLORIDE 9 MG/ML
500 INJECTION, SOLUTION INTRAVENOUS ONCE
Qty: 0 | Refills: 0 | Status: COMPLETED | OUTPATIENT
Start: 2019-01-14 | End: 2019-01-14

## 2019-01-14 RX ORDER — MAGNESIUM SULFATE 500 MG/ML
1.5 VIAL (ML) INJECTION
Qty: 40 | Refills: 0 | Status: DISCONTINUED | OUTPATIENT
Start: 2019-01-14 | End: 2019-01-22

## 2019-01-14 RX ADMIN — Medication 300 GRAM(S): at 15:37

## 2019-01-14 RX ADMIN — Medication 200 MILLIGRAM(S): at 15:23

## 2019-01-14 RX ADMIN — SODIUM CHLORIDE 50 MILLILITER(S): 9 INJECTION, SOLUTION INTRAVENOUS at 17:56

## 2019-01-14 RX ADMIN — SODIUM CHLORIDE 1000 MILLILITER(S): 9 INJECTION, SOLUTION INTRAVENOUS at 14:50

## 2019-01-14 RX ADMIN — Medication 20 MILLIGRAM(S): at 15:18

## 2019-01-14 RX ADMIN — Medication 50 GM/HR: at 16:15

## 2019-01-14 NOTE — DISCHARGE NOTE OB - PATIENT PORTAL LINK FT
You can access the [facility], offered by [facility], by registering with the following website: [email or web address]

## 2019-01-14 NOTE — DISCHARGE NOTE OB - CARE PROVIDER_API CALL
Krystyna Bailey), Obstetrics and Gynecology  [address] [place] [address]  Phone: [phone]  Fax: [phone]

## 2019-01-14 NOTE — DISCHARGE NOTE OB - CARE PLAN
Principal Discharge DX:	37 weeks gestation of pregnancy  Goal:	Safe delivery of patient  Assessment and plan of treatment:	Nothing in the vagina, no tampons, douches, baths, swimming or sex for 6-8 weeks.  Regular diet, follow up visit in 3 weeks  Secondary Diagnosis:	Dichorionic diamniotic twin pregnancy in third trimester  Secondary Diagnosis:	Pre-eclampsia in third trimester Principal Discharge DX:	37 weeks gestation of pregnancy  Goal:	Safe delivery of patient  Assessment and plan of treatment:	Nothing in the vagina, no tampons, douches, baths, swimming or sex for 6-8 weeks.  Regular diet, follow up visit in 3 weeks  Secondary Diagnosis:	Dichorionic diamniotic twin pregnancy in third trimester  Secondary Diagnosis:	Pre-eclampsia in third trimester  Goal:	control BP  Assessment and plan of treatment:	Labetalol 400mg Q 8H  Procardia xl 60mg q d Principal Discharge DX:	37 weeks gestation of pregnancy  Goal:	Safe delivery of patient  Assessment and plan of treatment:	Nothing in the vagina, no tampons, douches, baths, swimming or sex for 6-8 weeks.  Regular diet, follow up visit in 3 weeks  Secondary Diagnosis:	Dichorionic diamniotic twin pregnancy in third trimester  Secondary Diagnosis:	Pre-eclampsia in third trimester  Goal:	control BP  Assessment and plan of treatment:	Labetalol 600mg Q 8H  Procardia xl 90 mg po am  cleared by cardiology  Procardia xl 30 mg po pm

## 2019-01-14 NOTE — DISCHARGE NOTE OB - INSTRUCTIONS
Keep follow up appointment with doctor as instructed and call doctor if you have any signs of elevated blood pressures such as headache, visual changes, pain in right upper side of abdomen, chest discomfort, dizziness, feeling light headed or any questions or concerns.

## 2019-01-14 NOTE — DISCHARGE NOTE OB - HOSPITAL COURSE
39 y/o W/F  admitted at 37 wks with di-di twins and elevated BP. Pt was admitted for induction of labor and treatment of preeclampsia. Pt decided that she wanted a c/s. She was delivered of two health babies, a boy and a girl. Post partum course was complicated with elevated BP that required changes in her hypertensive medications. Ms Grissom has history of anxiety and was placed on Lexapro 50 mg for this as well. 39 y/o W/F  admitted at 37 wks with di-di twins and elevated BP. Pt was admitted for induction of labor and treatment of preeclampsia. Pt decided that she wanted a c/s. She was delivered of two health babies, a boy and a girl. Post partum course was complicated with elevated BP that required changes in her hypertensive medications.- Procardia XL 90mg XL AM, Procardia XL mg PM and Labetalol 600mg q 8H.  Ms Grissom has h/o anxiety and was started on Zoloft 50mg q day.  Seen by cardiology and cleared for d/c.

## 2019-01-14 NOTE — DISCHARGE NOTE OB - MEDICATION SUMMARY - MEDICATIONS TO TAKE
I will START or STAY ON the medications listed below when I get home from the hospital:    acetaminophen 325 mg oral tablet  -- 3 tab(s) by mouth every 6 hours, As needed, Mild Pain (1 - 3)  -- Indication: For Pain    ibuprofen 600 mg oral tablet  -- 1 tab(s) by mouth every 6 hours  -- Indication: For Pain    aspirin 81 mg oral tablet  -- 1 tab(s) by mouth once a day  -- Indication: For Aspirin    magnesium hydroxide 8% oral suspension  -- 30 milliliter(s) by mouth 2 times a day, As needed, Constipation  -- Indication: For Constipation    sertraline 50 mg oral tablet  -- 1 tab(s) by mouth once a day  -- Indication: For Anxiety    labetalol 200 mg oral tablet  -- 3 tab(s) by mouth every 8 hours   -- Indication: For BP    NIFEdipine 90 mg oral tablet, extended release  -- 1 tab(s) by mouth every 24 hours at 6am  -- Indication: For BP    NIFEdipine 30 mg oral tablet, extended release  -- 1 tab(s) by mouth every 24 hours  at 6PM  -- Indication: For BP    lanolin topical ointment  -- 1 application on skin every 6 hours, As needed, Sore Nipples  -- Indication: For Sore nipples    Prenatal Multivitamins with Folic Acid 1 mg oral tablet  -- 1 tab(s) by mouth once a day  -- Indication: For vitamin    docusate sodium 100 mg oral capsule  -- 1 cap(s) by mouth 2 times a day, As needed, Stool Softening  -- Indication: For Stool softner    ascorbic acid 250 mg oral tablet  -- 1 tab(s) by mouth 3 times a day  -- Indication: For vitamin

## 2019-01-14 NOTE — DISCHARGE NOTE OB - PLAN OF CARE
Safe delivery of patient Nothing in the vagina, no tampons, douches, baths, swimming or sex for 6-8 weeks.  Regular diet, follow up visit in 3 weeks control BP Labetalol 400mg Q 8H  Procardia xl 60mg q d Labetalol 600mg Q 8H  Procardia xl 90 mg po am  cleared by cardiology  Procardia xl 30 mg po pm

## 2019-01-15 LAB
ALBUMIN SERPL ELPH-MCNC: 2.8 G/DL — LOW (ref 3.3–5)
ALP SERPL-CCNC: 146 U/L — HIGH (ref 40–120)
ALT FLD-CCNC: 10 U/L — SIGNIFICANT CHANGE UP (ref 10–45)
ANION GAP SERPL CALC-SCNC: 14 MMOL/L — SIGNIFICANT CHANGE UP (ref 5–17)
APTT BLD: 23.9 SEC — LOW (ref 27.5–36.3)
AST SERPL-CCNC: 19 U/L — SIGNIFICANT CHANGE UP (ref 10–40)
BASOPHILS # BLD AUTO: 0 K/UL — SIGNIFICANT CHANGE UP (ref 0–0.2)
BASOPHILS # BLD AUTO: 0 K/UL — SIGNIFICANT CHANGE UP (ref 0–0.2)
BASOPHILS NFR BLD AUTO: 0.2 % — SIGNIFICANT CHANGE UP (ref 0–2)
BASOPHILS NFR BLD AUTO: 0.3 % — SIGNIFICANT CHANGE UP (ref 0–2)
BILIRUB SERPL-MCNC: 0.2 MG/DL — SIGNIFICANT CHANGE UP (ref 0.2–1.2)
BUN SERPL-MCNC: 5 MG/DL — LOW (ref 7–23)
CALCIUM SERPL-MCNC: 7.6 MG/DL — LOW (ref 8.4–10.5)
CHLORIDE SERPL-SCNC: 102 MMOL/L — SIGNIFICANT CHANGE UP (ref 96–108)
CO2 SERPL-SCNC: 18 MMOL/L — LOW (ref 22–31)
CREAT SERPL-MCNC: 0.58 MG/DL — SIGNIFICANT CHANGE UP (ref 0.5–1.3)
EOSINOPHIL # BLD AUTO: 0 K/UL — SIGNIFICANT CHANGE UP (ref 0–0.5)
EOSINOPHIL # BLD AUTO: 0 K/UL — SIGNIFICANT CHANGE UP (ref 0–0.5)
EOSINOPHIL NFR BLD AUTO: 0.1 % — SIGNIFICANT CHANGE UP (ref 0–6)
EOSINOPHIL NFR BLD AUTO: 0.5 % — SIGNIFICANT CHANGE UP (ref 0–6)
FIBRINOGEN PPP-MCNC: 638 MG/DL — HIGH (ref 350–510)
GLUCOSE SERPL-MCNC: 122 MG/DL — HIGH (ref 70–99)
HCT VFR BLD CALC: 24.3 % — LOW (ref 34.5–45)
HCT VFR BLD CALC: 28.5 % — LOW (ref 34.5–45)
HGB BLD-MCNC: 8.6 G/DL — LOW (ref 11.5–15.5)
HGB BLD-MCNC: 9.8 G/DL — LOW (ref 11.5–15.5)
INR BLD: 0.91 RATIO — SIGNIFICANT CHANGE UP (ref 0.88–1.16)
LDH SERPL L TO P-CCNC: 215 U/L — SIGNIFICANT CHANGE UP (ref 50–242)
LYMPHOCYTES # BLD AUTO: 1.4 K/UL — SIGNIFICANT CHANGE UP (ref 1–3.3)
LYMPHOCYTES # BLD AUTO: 1.4 K/UL — SIGNIFICANT CHANGE UP (ref 1–3.3)
LYMPHOCYTES # BLD AUTO: 12.1 % — LOW (ref 13–44)
LYMPHOCYTES # BLD AUTO: 13.4 % — SIGNIFICANT CHANGE UP (ref 13–44)
MAGNESIUM SERPL-MCNC: 3.6 MG/DL — HIGH (ref 1.6–2.6)
MAGNESIUM SERPL-MCNC: 4.3 MG/DL — HIGH (ref 1.6–2.6)
MAGNESIUM SERPL-MCNC: 4.4 MG/DL — HIGH (ref 1.6–2.6)
MAGNESIUM SERPL-MCNC: 5.1 MG/DL — HIGH (ref 1.6–2.6)
MCHC RBC-ENTMCNC: 29.9 PG — SIGNIFICANT CHANGE UP (ref 27–34)
MCHC RBC-ENTMCNC: 30.4 PG — SIGNIFICANT CHANGE UP (ref 27–34)
MCHC RBC-ENTMCNC: 34.6 GM/DL — SIGNIFICANT CHANGE UP (ref 32–36)
MCHC RBC-ENTMCNC: 35.2 GM/DL — SIGNIFICANT CHANGE UP (ref 32–36)
MCV RBC AUTO: 86.3 FL — SIGNIFICANT CHANGE UP (ref 80–100)
MCV RBC AUTO: 86.6 FL — SIGNIFICANT CHANGE UP (ref 80–100)
MONOCYTES # BLD AUTO: 0.6 K/UL — SIGNIFICANT CHANGE UP (ref 0–0.9)
MONOCYTES # BLD AUTO: 0.9 K/UL — SIGNIFICANT CHANGE UP (ref 0–0.9)
MONOCYTES NFR BLD AUTO: 5.7 % — SIGNIFICANT CHANGE UP (ref 2–14)
MONOCYTES NFR BLD AUTO: 8.4 % — SIGNIFICANT CHANGE UP (ref 2–14)
NEUTROPHILS # BLD AUTO: 8.2 K/UL — HIGH (ref 1.8–7.4)
NEUTROPHILS # BLD AUTO: 8.8 K/UL — HIGH (ref 1.8–7.4)
NEUTROPHILS NFR BLD AUTO: 79.2 % — HIGH (ref 43–77)
NEUTROPHILS NFR BLD AUTO: 80.1 % — HIGH (ref 43–77)
PLATELET # BLD AUTO: 111 K/UL — LOW (ref 150–400)
PLATELET # BLD AUTO: 136 K/UL — LOW (ref 150–400)
POTASSIUM SERPL-MCNC: 3.5 MMOL/L — SIGNIFICANT CHANGE UP (ref 3.5–5.3)
POTASSIUM SERPL-SCNC: 3.5 MMOL/L — SIGNIFICANT CHANGE UP (ref 3.5–5.3)
PROT SERPL-MCNC: 5 G/DL — LOW (ref 6–8.3)
PROTHROM AB SERPL-ACNC: 10.4 SEC — SIGNIFICANT CHANGE UP (ref 10–12.9)
RBC # BLD: 2.82 M/UL — LOW (ref 3.8–5.2)
RBC # BLD: 3.29 M/UL — LOW (ref 3.8–5.2)
RBC # FLD: 14.9 % — HIGH (ref 10.3–14.5)
RBC # FLD: 15.1 % — HIGH (ref 10.3–14.5)
SODIUM SERPL-SCNC: 134 MMOL/L — LOW (ref 135–145)
URATE SERPL-MCNC: 5.4 MG/DL — SIGNIFICANT CHANGE UP (ref 2.5–7)
WBC # BLD: 10.3 K/UL — SIGNIFICANT CHANGE UP (ref 3.8–10.5)
WBC # BLD: 11.2 K/UL — HIGH (ref 3.8–10.5)
WBC # FLD AUTO: 10.3 K/UL — SIGNIFICANT CHANGE UP (ref 3.8–10.5)
WBC # FLD AUTO: 11.2 K/UL — HIGH (ref 3.8–10.5)

## 2019-01-15 PROCEDURE — 88307 TISSUE EXAM BY PATHOLOGIST: CPT | Mod: 26

## 2019-01-15 RX ORDER — DOCUSATE SODIUM 100 MG
100 CAPSULE ORAL
Qty: 0 | Refills: 0 | Status: DISCONTINUED | OUTPATIENT
Start: 2019-01-15 | End: 2019-01-16

## 2019-01-15 RX ORDER — METOCLOPRAMIDE HCL 10 MG
10 TABLET ORAL ONCE
Qty: 0 | Refills: 0 | Status: COMPLETED | OUTPATIENT
Start: 2019-01-15 | End: 2019-01-15

## 2019-01-15 RX ORDER — TETANUS TOXOID, REDUCED DIPHTHERIA TOXOID AND ACELLULAR PERTUSSIS VACCINE, ADSORBED 5; 2.5; 8; 8; 2.5 [IU]/.5ML; [IU]/.5ML; UG/.5ML; UG/.5ML; UG/.5ML
0.5 SUSPENSION INTRAMUSCULAR ONCE
Qty: 0 | Refills: 0 | Status: COMPLETED | OUTPATIENT
Start: 2019-01-15

## 2019-01-15 RX ORDER — SODIUM CHLORIDE 9 MG/ML
1000 INJECTION, SOLUTION INTRAVENOUS
Qty: 0 | Refills: 0 | Status: DISCONTINUED | OUTPATIENT
Start: 2019-01-15 | End: 2019-01-15

## 2019-01-15 RX ORDER — OXYTOCIN 10 UNIT/ML
333.33 VIAL (ML) INJECTION
Qty: 20 | Refills: 0 | Status: DISCONTINUED | OUTPATIENT
Start: 2019-01-15 | End: 2019-01-22

## 2019-01-15 RX ORDER — OXYCODONE HYDROCHLORIDE 5 MG/1
5 TABLET ORAL EVERY 4 HOURS
Qty: 0 | Refills: 0 | Status: DISCONTINUED | OUTPATIENT
Start: 2019-01-15 | End: 2019-01-22

## 2019-01-15 RX ORDER — CARBOPROST TROMETHAMINE 250 UG/ML
250 INJECTION, SOLUTION INTRAMUSCULAR ONCE
Qty: 0 | Refills: 0 | Status: COMPLETED | OUTPATIENT
Start: 2019-01-15 | End: 2019-01-15

## 2019-01-15 RX ORDER — KETOROLAC TROMETHAMINE 30 MG/ML
30 SYRINGE (ML) INJECTION ONCE
Qty: 0 | Refills: 0 | Status: DISCONTINUED | OUTPATIENT
Start: 2019-01-15 | End: 2019-01-15

## 2019-01-15 RX ORDER — OXYTOCIN 10 UNIT/ML
41.67 VIAL (ML) INJECTION
Qty: 20 | Refills: 0 | Status: DISCONTINUED | OUTPATIENT
Start: 2019-01-15 | End: 2019-01-22

## 2019-01-15 RX ORDER — DIPHENHYDRAMINE HCL 50 MG
25 CAPSULE ORAL EVERY 6 HOURS
Qty: 0 | Refills: 0 | Status: DISCONTINUED | OUTPATIENT
Start: 2019-01-15 | End: 2019-01-22

## 2019-01-15 RX ORDER — ACETAMINOPHEN 500 MG
1000 TABLET ORAL ONCE
Qty: 0 | Refills: 0 | Status: COMPLETED | OUTPATIENT
Start: 2019-01-15 | End: 2019-01-15

## 2019-01-15 RX ORDER — LANOLIN
1 OINTMENT (GRAM) TOPICAL
Qty: 0 | Refills: 0 | Status: DISCONTINUED | OUTPATIENT
Start: 2019-01-15 | End: 2019-01-22

## 2019-01-15 RX ORDER — SIMETHICONE 80 MG/1
80 TABLET, CHEWABLE ORAL EVERY 4 HOURS
Qty: 0 | Refills: 0 | Status: DISCONTINUED | OUTPATIENT
Start: 2019-01-15 | End: 2019-01-22

## 2019-01-15 RX ORDER — FERROUS SULFATE 325(65) MG
325 TABLET ORAL DAILY
Qty: 0 | Refills: 0 | Status: DISCONTINUED | OUTPATIENT
Start: 2019-01-15 | End: 2019-01-16

## 2019-01-15 RX ORDER — CITRIC ACID/SODIUM CITRATE 300-500 MG
15 SOLUTION, ORAL ORAL ONCE
Qty: 0 | Refills: 0 | Status: COMPLETED | OUTPATIENT
Start: 2019-01-15 | End: 2019-01-15

## 2019-01-15 RX ORDER — DEXAMETHASONE 0.5 MG/5ML
4 ELIXIR ORAL EVERY 6 HOURS
Qty: 0 | Refills: 0 | Status: DISCONTINUED | OUTPATIENT
Start: 2019-01-15 | End: 2019-01-17

## 2019-01-15 RX ORDER — IBUPROFEN 200 MG
600 TABLET ORAL EVERY 6 HOURS
Qty: 0 | Refills: 0 | Status: COMPLETED | OUTPATIENT
Start: 2019-01-15 | End: 2019-12-14

## 2019-01-15 RX ORDER — TRANEXAMIC ACID 100 MG/ML
1000 INJECTION, SOLUTION INTRAVENOUS ONCE
Qty: 0 | Refills: 0 | Status: COMPLETED | OUTPATIENT
Start: 2019-01-15 | End: 2019-01-15

## 2019-01-15 RX ORDER — ACETAMINOPHEN 500 MG
975 TABLET ORAL EVERY 6 HOURS
Qty: 0 | Refills: 0 | Status: DISCONTINUED | OUTPATIENT
Start: 2019-01-15 | End: 2019-01-22

## 2019-01-15 RX ORDER — OXYCODONE HYDROCHLORIDE 5 MG/1
5 TABLET ORAL
Qty: 0 | Refills: 0 | Status: COMPLETED | OUTPATIENT
Start: 2019-01-15 | End: 2019-01-22

## 2019-01-15 RX ORDER — DIPHENOXYLATE HCL/ATROPINE 2.5-.025MG
2 TABLET ORAL ONCE
Qty: 0 | Refills: 0 | Status: DISCONTINUED | OUTPATIENT
Start: 2019-01-15 | End: 2019-01-15

## 2019-01-15 RX ORDER — NALOXONE HYDROCHLORIDE 4 MG/.1ML
0.1 SPRAY NASAL
Qty: 0 | Refills: 0 | Status: DISCONTINUED | OUTPATIENT
Start: 2019-01-15 | End: 2019-01-17

## 2019-01-15 RX ORDER — OXYTOCIN 10 UNIT/ML
41.67 VIAL (ML) INJECTION
Qty: 20 | Refills: 0 | Status: DISCONTINUED | OUTPATIENT
Start: 2019-01-15 | End: 2019-01-15

## 2019-01-15 RX ORDER — KETOROLAC TROMETHAMINE 30 MG/ML
30 SYRINGE (ML) INJECTION EVERY 6 HOURS
Qty: 0 | Refills: 0 | Status: DISCONTINUED | OUTPATIENT
Start: 2019-01-15 | End: 2019-01-16

## 2019-01-15 RX ORDER — GLYCERIN ADULT
1 SUPPOSITORY, RECTAL RECTAL AT BEDTIME
Qty: 0 | Refills: 0 | Status: DISCONTINUED | OUTPATIENT
Start: 2019-01-15 | End: 2019-01-22

## 2019-01-15 RX ORDER — HEPARIN SODIUM 5000 [USP'U]/ML
5000 INJECTION INTRAVENOUS; SUBCUTANEOUS EVERY 12 HOURS
Qty: 0 | Refills: 0 | Status: DISCONTINUED | OUTPATIENT
Start: 2019-01-15 | End: 2019-01-22

## 2019-01-15 RX ORDER — ONDANSETRON 8 MG/1
4 TABLET, FILM COATED ORAL EVERY 6 HOURS
Qty: 0 | Refills: 0 | Status: DISCONTINUED | OUTPATIENT
Start: 2019-01-15 | End: 2019-01-17

## 2019-01-15 RX ADMIN — Medication 30 MILLIGRAM(S): at 11:56

## 2019-01-15 RX ADMIN — Medication 30 MILLIGRAM(S): at 18:43

## 2019-01-15 RX ADMIN — CARBOPROST TROMETHAMINE 250 MICROGRAM(S): 250 INJECTION, SOLUTION INTRAMUSCULAR at 02:24

## 2019-01-15 RX ADMIN — SODIUM CHLORIDE 125 MILLILITER(S): 9 INJECTION, SOLUTION INTRAVENOUS at 13:37

## 2019-01-15 RX ADMIN — Medication 50 GM/HR: at 13:36

## 2019-01-15 RX ADMIN — HEPARIN SODIUM 5000 UNIT(S): 5000 INJECTION INTRAVENOUS; SUBCUTANEOUS at 09:56

## 2019-01-15 RX ADMIN — Medication 30 MILLIGRAM(S): at 12:30

## 2019-01-15 RX ADMIN — Medication 200 MILLIGRAM(S): at 13:33

## 2019-01-15 RX ADMIN — Medication 200 MILLIGRAM(S): at 03:40

## 2019-01-15 RX ADMIN — Medication 400 MILLIGRAM(S): at 05:11

## 2019-01-15 RX ADMIN — Medication 10 MILLIGRAM(S): at 18:12

## 2019-01-15 RX ADMIN — Medication 2 TABLET(S): at 04:13

## 2019-01-15 RX ADMIN — HEPARIN SODIUM 5000 UNIT(S): 5000 INJECTION INTRAVENOUS; SUBCUTANEOUS at 22:55

## 2019-01-15 RX ADMIN — Medication 200 MILLIGRAM(S): at 22:55

## 2019-01-15 RX ADMIN — ONDANSETRON 4 MILLIGRAM(S): 8 TABLET, FILM COATED ORAL at 13:33

## 2019-01-15 RX ADMIN — Medication 50 GM/HR: at 04:06

## 2019-01-15 RX ADMIN — Medication 4 MILLIGRAM(S): at 14:31

## 2019-01-15 RX ADMIN — Medication 30 MILLIGRAM(S): at 18:12

## 2019-01-15 RX ADMIN — Medication 15 MILLILITER(S): at 05:12

## 2019-01-15 RX ADMIN — TRANEXAMIC ACID 220 MILLIGRAM(S): 100 INJECTION, SOLUTION INTRAVENOUS at 01:30

## 2019-01-16 LAB
ALBUMIN SERPL ELPH-MCNC: 2.8 G/DL — LOW (ref 3.3–5)
ALP SERPL-CCNC: 133 U/L — HIGH (ref 40–120)
ALT FLD-CCNC: 11 U/L — SIGNIFICANT CHANGE UP (ref 10–45)
ANION GAP SERPL CALC-SCNC: 11 MMOL/L — SIGNIFICANT CHANGE UP (ref 5–17)
APTT BLD: 24.1 SEC — LOW (ref 27.5–36.3)
AST SERPL-CCNC: 14 U/L — SIGNIFICANT CHANGE UP (ref 10–40)
BASOPHILS # BLD AUTO: 0 K/UL — SIGNIFICANT CHANGE UP (ref 0–0.2)
BASOPHILS NFR BLD AUTO: 0.3 % — SIGNIFICANT CHANGE UP (ref 0–2)
BILIRUB SERPL-MCNC: 0.1 MG/DL — LOW (ref 0.2–1.2)
BUN SERPL-MCNC: 9 MG/DL — SIGNIFICANT CHANGE UP (ref 7–23)
CALCIUM SERPL-MCNC: 7.9 MG/DL — LOW (ref 8.4–10.5)
CHLORIDE SERPL-SCNC: 102 MMOL/L — SIGNIFICANT CHANGE UP (ref 96–108)
CO2 SERPL-SCNC: 22 MMOL/L — SIGNIFICANT CHANGE UP (ref 22–31)
CREAT SERPL-MCNC: 0.8 MG/DL — SIGNIFICANT CHANGE UP (ref 0.5–1.3)
EOSINOPHIL # BLD AUTO: 0.1 K/UL — SIGNIFICANT CHANGE UP (ref 0–0.5)
EOSINOPHIL NFR BLD AUTO: 0.8 % — SIGNIFICANT CHANGE UP (ref 0–6)
FIBRINOGEN PPP-MCNC: 674 MG/DL — HIGH (ref 350–510)
GLUCOSE SERPL-MCNC: 80 MG/DL — SIGNIFICANT CHANGE UP (ref 70–99)
HCT VFR BLD CALC: 24 % — LOW (ref 34.5–45)
HGB BLD-MCNC: 8 G/DL — LOW (ref 11.5–15.5)
INR BLD: 0.88 RATIO — SIGNIFICANT CHANGE UP (ref 0.88–1.16)
LDH SERPL L TO P-CCNC: 221 U/L — SIGNIFICANT CHANGE UP (ref 50–242)
LYMPHOCYTES # BLD AUTO: 1.9 K/UL — SIGNIFICANT CHANGE UP (ref 1–3.3)
LYMPHOCYTES # BLD AUTO: 19.3 % — SIGNIFICANT CHANGE UP (ref 13–44)
MCHC RBC-ENTMCNC: 29.8 PG — SIGNIFICANT CHANGE UP (ref 27–34)
MCHC RBC-ENTMCNC: 33.5 GM/DL — SIGNIFICANT CHANGE UP (ref 32–36)
MCV RBC AUTO: 88.9 FL — SIGNIFICANT CHANGE UP (ref 80–100)
MONOCYTES # BLD AUTO: 0.7 K/UL — SIGNIFICANT CHANGE UP (ref 0–0.9)
MONOCYTES NFR BLD AUTO: 7.1 % — SIGNIFICANT CHANGE UP (ref 2–14)
NEUTROPHILS # BLD AUTO: 7 K/UL — SIGNIFICANT CHANGE UP (ref 1.8–7.4)
NEUTROPHILS NFR BLD AUTO: 72.5 % — SIGNIFICANT CHANGE UP (ref 43–77)
PLATELET # BLD AUTO: 119 K/UL — LOW (ref 150–400)
POTASSIUM SERPL-MCNC: 4.1 MMOL/L — SIGNIFICANT CHANGE UP (ref 3.5–5.3)
POTASSIUM SERPL-SCNC: 4.1 MMOL/L — SIGNIFICANT CHANGE UP (ref 3.5–5.3)
PROT SERPL-MCNC: 5 G/DL — LOW (ref 6–8.3)
PROTHROM AB SERPL-ACNC: 10 SEC — SIGNIFICANT CHANGE UP (ref 10–12.9)
RBC # BLD: 2.7 M/UL — LOW (ref 3.8–5.2)
RBC # FLD: 15.1 % — HIGH (ref 10.3–14.5)
SODIUM SERPL-SCNC: 135 MMOL/L — SIGNIFICANT CHANGE UP (ref 135–145)
URATE SERPL-MCNC: 6.2 MG/DL — SIGNIFICANT CHANGE UP (ref 2.5–7)
WBC # BLD: 9.6 K/UL — SIGNIFICANT CHANGE UP (ref 3.8–10.5)
WBC # FLD AUTO: 9.6 K/UL — SIGNIFICANT CHANGE UP (ref 3.8–10.5)

## 2019-01-16 RX ORDER — FAMOTIDINE 10 MG/ML
20 INJECTION INTRAVENOUS
Qty: 0 | Refills: 0 | Status: DISCONTINUED | OUTPATIENT
Start: 2019-01-16 | End: 2019-01-22

## 2019-01-16 RX ORDER — DOCUSATE SODIUM 100 MG
100 CAPSULE ORAL
Qty: 0 | Refills: 0 | Status: DISCONTINUED | OUTPATIENT
Start: 2019-01-16 | End: 2019-01-22

## 2019-01-16 RX ORDER — IBUPROFEN 200 MG
600 TABLET ORAL EVERY 6 HOURS
Qty: 0 | Refills: 0 | Status: DISCONTINUED | OUTPATIENT
Start: 2019-01-16 | End: 2019-01-22

## 2019-01-16 RX ORDER — OXYCODONE HYDROCHLORIDE 5 MG/1
5 TABLET ORAL
Qty: 0 | Refills: 0 | Status: DISCONTINUED | OUTPATIENT
Start: 2019-01-16 | End: 2019-01-22

## 2019-01-16 RX ORDER — FERROUS SULFATE 325(65) MG
325 TABLET ORAL
Qty: 0 | Refills: 0 | Status: DISCONTINUED | OUTPATIENT
Start: 2019-01-16 | End: 2019-01-22

## 2019-01-16 RX ADMIN — Medication 200 MILLIGRAM(S): at 06:25

## 2019-01-16 RX ADMIN — Medication 975 MILLIGRAM(S): at 13:29

## 2019-01-16 RX ADMIN — Medication 975 MILLIGRAM(S): at 18:33

## 2019-01-16 RX ADMIN — Medication 975 MILLIGRAM(S): at 18:31

## 2019-01-16 RX ADMIN — Medication 975 MILLIGRAM(S): at 13:25

## 2019-01-16 RX ADMIN — HEPARIN SODIUM 5000 UNIT(S): 5000 INJECTION INTRAVENOUS; SUBCUTANEOUS at 22:58

## 2019-01-16 RX ADMIN — ONDANSETRON 4 MILLIGRAM(S): 8 TABLET, FILM COATED ORAL at 11:46

## 2019-01-16 RX ADMIN — Medication 200 MILLIGRAM(S): at 13:29

## 2019-01-16 RX ADMIN — Medication 600 MILLIGRAM(S): at 18:33

## 2019-01-16 RX ADMIN — FAMOTIDINE 20 MILLIGRAM(S): 10 INJECTION INTRAVENOUS at 18:39

## 2019-01-16 RX ADMIN — Medication 100 MILLIGRAM(S): at 18:31

## 2019-01-16 RX ADMIN — Medication 30 MILLIGRAM(S): at 02:17

## 2019-01-16 RX ADMIN — Medication 325 MILLIGRAM(S): at 18:31

## 2019-01-16 RX ADMIN — HEPARIN SODIUM 5000 UNIT(S): 5000 INJECTION INTRAVENOUS; SUBCUTANEOUS at 11:42

## 2019-01-16 RX ADMIN — Medication 1 TABLET(S): at 11:42

## 2019-01-16 RX ADMIN — Medication 600 MILLIGRAM(S): at 18:31

## 2019-01-16 RX ADMIN — Medication 200 MILLIGRAM(S): at 20:56

## 2019-01-16 RX ADMIN — Medication 30 MILLIGRAM(S): at 02:40

## 2019-01-16 NOTE — PROGRESS NOTE ADULT - PROBLEM SELECTOR PLAN 1
CV: Hemodynamically stable, continue to monitor VS, HELLP labs this AM. C/w Labetalol 200 TID for BP control.  Pulm: Saturating well on RA. Increase incentive spirometry.  GI: Advance diet as tolerated  : White[place] in place. Adequate UOP.  Heme: Continue HSQ/Venodynes for DVT ppx. Increase OOB.    Neuro: PCEA for pain control. C/w Mg for seizure pp x 24 hrs.    Gary, [email or web address]

## 2019-01-16 NOTE — PROGRESS NOTE ADULT - SUBJECTIVE AND OBJECTIVE BOX
OB Progress Note POD #1    Subjective:   Pt seen and examined at bedside. No events overnight. Pain well controlled. Patient ambulating. Passing flatus. Tolerating regular diet. Pt denies fever, chills, chest pain, SOB, nausea, vomiting, lightheadedness, dizziness. Pt denies HA, changes in vision, RUQ pain.     Objective:  T(F): 97.5 (01-16-19 @ 02:27), Max: 98.4 (01-15-19 @ 06:40)  HR: 71 (01-16-19 @ 02:27) (65 - 86)  BP: 145/77 (01-16-19 @ 02:27) (117/66 - 145/77)  RR: 18 (01-16-19 @ 02:27) (18 - 18)  SpO2: 98% (01-16-19 @ 02:27) (95% - 99%)    I&O's Summary    14 Jan 2019 07:01  -  15 Murphy 2019 07:00  --------------------------------------------------------  IN: 4404 mL / OUT: 2200 mL / NET: 2204 mL    15 Murphy 2019 07:01  -  16 Jan 2019 05:30  --------------------------------------------------------  IN: 2560 mL / OUT: 700 mL / NET: 1860 mL    MEDICATIONS  (STANDING):  acetaminophen   Tablet .. 975 milliGRAM(s) Oral every 6 hours  diphtheria/tetanus/pertussis (acellular) Vaccine (ADAcel) 0.5 milliLiter(s) IntraMuscular once  fentaNYL (3 MICROgram(s)/mL) + BUpivacaine 0.01% in 0.9% Sodium Chloride PCEA 250 milliLiter(s) Epidural PCA Continuous  ferrous    sulfate 325 milliGRAM(s) Oral daily  heparin  Injectable 5000 Unit(s) SubCutaneous every 12 hours  ibuprofen  Tablet. 600 milliGRAM(s) Oral every 6 hours  labetalol 200 milliGRAM(s) Oral three times a day  magnesium sulfate Infusion 1.5 Gm/Hr (37.5 mL/Hr) IV Continuous <Continuous>  misoprostol Oral Solution 40 MICROGram(s) Oral every 2 hours  misoprostol Oral Solution 60 MICROGram(s) Oral every 2 hours  oxyCODONE    IR 5 milliGRAM(s) Oral every 3 hours  oxytocin Infusion 333.333 milliUNIT(s)/Min (1000 mL/Hr) IV Continuous <Continuous>  oxytocin Infusion 41.667 milliUNIT(s)/Min (125 mL/Hr) IV Continuous <Continuous>  prenatal multivitamin 1 Tablet(s) Oral daily    MEDICATIONS  (PRN):  acetaminophen   Tablet .. 975 milliGRAM(s) Oral every 6 hours PRN Mild Pain (1 - 3)  dexamethasone  Injectable 4 milliGRAM(s) IV Push every 6 hours PRN Nausea, IF ondansetron is ineffective after 30 - 60 minutes  diphenhydrAMINE 25 milliGRAM(s) Oral every 6 hours PRN Itching  docusate sodium 100 milliGRAM(s) Oral two times a day PRN Stool Softening  fentaNYL (3 MICROgram(s)/mL) + BUpivacaine 0.01% in 0.9% Sodium Chloride PCEA Rescue Clinician Bolus 5 milliLiter(s) Epidural every 15 minutes PRN Moderate Pain (4 - 6)  glycerin Suppository - Adult 1 Suppository(s) Rectal at bedtime PRN Constipation  lanolin Ointment 1 Application(s) Topical every 3 hours PRN Sore Nipples  naloxone Injectable 0.1 milliGRAM(s) IV Push every 3 minutes PRN For ANY of the following changes in patient status:  A. RR LESS THAN 10 breaths per minute, B. Oxygen saturation LESS THAN 90%, C. Sedation score of 6  ondansetron Injectable 4 milliGRAM(s) IV Push every 6 hours PRN Nausea  oxyCODONE    IR 5 milliGRAM(s) Oral every 4 hours PRN Severe Pain (7 - 10)  simethicone 80 milliGRAM(s) Chew every 4 hours PRN Gas      Physical Exam:  Constitutional: NAD, A+O x3  Abdomen: soft, nondistended, no guarding, no rebound  Incision: Pfannenstiel incision - clean, dry, intact (dressing removed)  Extremities: no lower extremity edema or calf tenderness bilaterally; venodynes in place    LABS:    01-15    134<L>  |  102    |  5<L>   ----------------------------<  122<H>  3.5     |  18<L>  |  0.58     Ca    7.6<L>      15 Murphy 2019 10:17  Mg     4.3       01-15  Mg     5.1       01-15  Mg     4.4       01-15  Mg     3.6       01-15  Mg     3.8       01-14    TPro  5.0<L>  /  Alb  2.8<L>  /  TBili  0.2    /  DBili  x      /  AST  19     /  ALT  10     /  AlkPhos  146<H>  01-15        PT/INR - ( 15 Murphy 2019 10:17 )   PT: 10.4 sec;   INR: 0.91 ratio         PTT - ( 15 Murphy 2019 10:17 )  PTT:23.9 sec

## 2019-01-16 NOTE — PROGRESS NOTE ADULT - SUBJECTIVE AND OBJECTIVE BOX
Day 1 of Anesthesia Pain Management Service    SUBJECTIVE: I'm okay  Pain Scale Score:    [X] Refer to charted pain scores    THERAPY: Epidural Bupivacaine 0.01 % and Fentanyl 3 micrograms/mL     Demand Dose: 3 mL  Lockout: 15 minutes   Continuous Rate:  10 mL    MEDICATIONS  (STANDING):  acetaminophen   Tablet .. 975 milliGRAM(s) Oral every 6 hours  diphtheria/tetanus/pertussis (acellular) Vaccine (ADAcel) 0.5 milliLiter(s) IntraMuscular once  docusate sodium 100 milliGRAM(s) Oral two times a day  fentaNYL (3 MICROgram(s)/mL) + BUpivacaine 0.01% in 0.9% Sodium Chloride PCEA 250 milliLiter(s) Epidural PCA Continuous  ferrous    sulfate 325 milliGRAM(s) Oral two times a day  heparin  Injectable 5000 Unit(s) SubCutaneous every 12 hours  ibuprofen  Tablet. 600 milliGRAM(s) Oral every 6 hours  labetalol 200 milliGRAM(s) Oral three times a day  magnesium sulfate Infusion 1.5 Gm/Hr (37.5 mL/Hr) IV Continuous <Continuous>  misoprostol Oral Solution 40 MICROGram(s) Oral every 2 hours  misoprostol Oral Solution 60 MICROGram(s) Oral every 2 hours  oxyCODONE    IR 5 milliGRAM(s) Oral every 3 hours  oxytocin Infusion 333.333 milliUNIT(s)/Min (1000 mL/Hr) IV Continuous <Continuous>  oxytocin Infusion 41.667 milliUNIT(s)/Min (125 mL/Hr) IV Continuous <Continuous>  prenatal multivitamin 1 Tablet(s) Oral daily    MEDICATIONS  (PRN):  acetaminophen   Tablet .. 975 milliGRAM(s) Oral every 6 hours PRN Mild Pain (1 - 3)  dexamethasone  Injectable 4 milliGRAM(s) IV Push every 6 hours PRN Nausea, IF ondansetron is ineffective after 30 - 60 minutes  diphenhydrAMINE 25 milliGRAM(s) Oral every 6 hours PRN Itching  fentaNYL (3 MICROgram(s)/mL) + BUpivacaine 0.01% in 0.9% Sodium Chloride PCEA Rescue Clinician Bolus 5 milliLiter(s) Epidural every 15 minutes PRN Moderate Pain (4 - 6)  glycerin Suppository - Adult 1 Suppository(s) Rectal at bedtime PRN Constipation  lanolin Ointment 1 Application(s) Topical every 3 hours PRN Sore Nipples  naloxone Injectable 0.1 milliGRAM(s) IV Push every 3 minutes PRN For ANY of the following changes in patient status:  A. RR LESS THAN 10 breaths per minute, B. Oxygen saturation LESS THAN 90%, C. Sedation score of 6  ondansetron Injectable 4 milliGRAM(s) IV Push every 6 hours PRN Nausea  oxyCODONE    IR 5 milliGRAM(s) Oral every 4 hours PRN Severe Pain (7 - 10)  simethicone 80 milliGRAM(s) Chew every 4 hours PRN Gas      OBJECTIVE:    Assessment of Epidural Catheter Site: 	    [X] Dressing changed 	[X] Site non-tender	[X] Site without erythema, discharge, edema  [X] Epidural tubing and connection checked	[X] Gross neurological exam within normal limits  [ ] Catheter removed – tip intact		    PT/INR - ( 16 Jan 2019 07:06 )   PT: 10.0 sec;   INR: 0.88 ratio         PTT - ( 16 Jan 2019 07:06 )  PTT:24.1 sec                      8.0    9.6   )-----------( 119      ( 16 Jan 2019 07:06 )             24.0     Vital Signs Last 24 Hrs  T(C): 36.5 (01-16-19 @ 09:03), Max: 36.8 (01-15-19 @ 10:01)  T(F): 97.7 (01-16-19 @ 09:03), Max: 98.3 (01-15-19 @ 10:01)  HR: 72 (01-16-19 @ 09:03) (65 - 85)  BP: 134/84 (01-16-19 @ 09:03) (117/66 - 145/77)  BP(mean): --  RR: 18 (01-16-19 @ 09:03) (18 - 18)  SpO2: 98% (01-16-19 @ 09:03) (95% - 99%)      Sedation Score:	[X] Alert	[ ] Drowsy	[ ] Arousable  [ ] Asleep  [ ] Unresponsive    Side Effects:	[X] None	[ ] Nausea	[ ] Vomiting  [ ] Pruritus  		[ ] Weakness  [ ] Numbness  [ ] Other:    ASSESSMENT/ PLAN:    Therapy:                         [X] Continue   [ ] Discontinue   [ ] Change to PRN Analgesics    Documentation and Verification of current medications:  [X] Done	[ ] Not done, not eligible, reason:    Comments:

## 2019-01-16 NOTE — PROGRESS NOTE ADULT - ASSESSMENT
38y female POD#1, s/p pLTCS for TIUP/sPEC. Pt now on Mg x 24hrs for pp seizure ppx. Doing well this AM.

## 2019-01-16 NOTE — PROGRESS NOTE ADULT - SUBJECTIVE AND OBJECTIVE BOX
Section/Postpartum  TORRIE BARKSDALE is a  38y woman , who is now post-operative day: 1  PAST MEDICAL & SURGICAL HISTORY:    Subjective:  The patient feels well.  She is ambulating.   She is tolerating regular diet.  She denies nausea and vomiting.  She is voiding.  Her pain is controlled.  She reports normal postpartum bleeding.  She is breastfeeding.  She is formula feeding.    Physical exam:    Vital Signs Last 24 Hrs  T(C): 36.5 (2019 09:03), Max: 36.8 (15 Murphy 2019 10:01)  T(F): 97.7 (2019 09:03), Max: 98.3 (15 Murphy 2019 10:01)  HR: 72 (2019 09:03) (65 - 85)  BP: 134/84 (2019 09:03) (117/66 - 145/77)  BP(mean): --  RR: 18 (2019 09:03) (18 - 18)  SpO2: 98% (2019 09:03) (95% - 99%)    General: alert and oriented in no acute distress.  Breast: Soft, nontender, not engorged.  Abdomen: Soft, nontender, not distended ,  Uterine fundus is firm and at below the umbilicus, BS (+), Flatus (+), Bowel Movement (-)  Incision: Clean, dry, and intact, bandage has been removed  Pelvic: Normal lochia noted  Ext: No calf tenderness  Lochia: not excessive      LABS:                        8.0    9.6   )-----------( 119      ( 2019 07:06 )             24.0       Rubella status: Immune     Blood Type:   Type + Screen (19 @ 16:18)    ABO Interpretation: B    Rh Interpretation: Positive    Antibody Screen: Negative                       Allergies    penicillin (Rash)    Intolerances      MEDICATIONS  (STANDING):  acetaminophen   Tablet .. 975 milliGRAM(s) Oral every 6 hours  diphtheria/tetanus/pertussis (acellular) Vaccine (ADAcel) 0.5 milliLiter(s) IntraMuscular once  docusate sodium 100 milliGRAM(s) Oral two times a day  fentaNYL (3 MICROgram(s)/mL) + BUpivacaine 0.01% in 0.9% Sodium Chloride PCEA 250 milliLiter(s) Epidural PCA Continuous  ferrous    sulfate 325 milliGRAM(s) Oral two times a day  heparin  Injectable 5000 Unit(s) SubCutaneous every 12 hours  ibuprofen  Tablet. 600 milliGRAM(s) Oral every 6 hours  labetalol 200 milliGRAM(s) Oral three times a day  magnesium sulfate Infusion 1.5 Gm/Hr (37.5 mL/Hr) IV Continuous <Continuous>  misoprostol Oral Solution 40 MICROGram(s) Oral every 2 hours  misoprostol Oral Solution 60 MICROGram(s) Oral every 2 hours  oxyCODONE    IR 5 milliGRAM(s) Oral every 3 hours  oxytocin Infusion 333.333 milliUNIT(s)/Min (1000 mL/Hr) IV Continuous <Continuous>  oxytocin Infusion 41.667 milliUNIT(s)/Min (125 mL/Hr) IV Continuous <Continuous>  prenatal multivitamin 1 Tablet(s) Oral daily    MEDICATIONS  (PRN):  acetaminophen   Tablet .. 975 milliGRAM(s) Oral every 6 hours PRN Mild Pain (1 - 3)  dexamethasone  Injectable 4 milliGRAM(s) IV Push every 6 hours PRN Nausea, IF ondansetron is ineffective after 30 - 60 minutes  diphenhydrAMINE 25 milliGRAM(s) Oral every 6 hours PRN Itching  fentaNYL (3 MICROgram(s)/mL) + BUpivacaine 0.01% in 0.9% Sodium Chloride PCEA Rescue Clinician Bolus 5 milliLiter(s) Epidural every 15 minutes PRN Moderate Pain (4 - 6)  glycerin Suppository - Adult 1 Suppository(s) Rectal at bedtime PRN Constipation  lanolin Ointment 1 Application(s) Topical every 3 hours PRN Sore Nipples  naloxone Injectable 0.1 milliGRAM(s) IV Push every 3 minutes PRN For ANY of the following changes in patient status:  A. RR LESS THAN 10 breaths per minute, B. Oxygen saturation LESS THAN 90%, C. Sedation score of 6  ondansetron Injectable 4 milliGRAM(s) IV Push every 6 hours PRN Nausea  oxyCODONE    IR 5 milliGRAM(s) Oral every 4 hours PRN Severe Pain (7 - 10)  simethicone 80 milliGRAM(s) Chew every 4 hours PRN Gas        Assessment and Plan  POD # 1 s/p   PCS    Doing well.  Encourage ambulation, may shower, routine postop care

## 2019-01-17 ENCOUNTER — APPOINTMENT (OUTPATIENT)
Dept: ANTEPARTUM | Facility: CLINIC | Age: 39
End: 2019-01-17

## 2019-01-17 RX ORDER — NIFEDIPINE 30 MG
30 TABLET, EXTENDED RELEASE 24 HR ORAL DAILY
Qty: 0 | Refills: 0 | Status: DISCONTINUED | OUTPATIENT
Start: 2019-01-17 | End: 2019-01-18

## 2019-01-17 RX ORDER — LABETALOL HCL 100 MG
300 TABLET ORAL EVERY 8 HOURS
Qty: 0 | Refills: 0 | Status: DISCONTINUED | OUTPATIENT
Start: 2019-01-17 | End: 2019-01-18

## 2019-01-17 RX ORDER — LABETALOL HCL 100 MG
100 TABLET ORAL ONCE
Qty: 0 | Refills: 0 | Status: COMPLETED | OUTPATIENT
Start: 2019-01-17 | End: 2019-01-17

## 2019-01-17 RX ADMIN — Medication 30 MILLIGRAM(S): at 06:52

## 2019-01-17 RX ADMIN — Medication 600 MILLIGRAM(S): at 01:45

## 2019-01-17 RX ADMIN — Medication 600 MILLIGRAM(S): at 18:52

## 2019-01-17 RX ADMIN — Medication 975 MILLIGRAM(S): at 18:18

## 2019-01-17 RX ADMIN — Medication 1 TABLET(S): at 14:01

## 2019-01-17 RX ADMIN — Medication 975 MILLIGRAM(S): at 18:52

## 2019-01-17 RX ADMIN — Medication 975 MILLIGRAM(S): at 06:43

## 2019-01-17 RX ADMIN — Medication 600 MILLIGRAM(S): at 18:18

## 2019-01-17 RX ADMIN — Medication 975 MILLIGRAM(S): at 13:15

## 2019-01-17 RX ADMIN — HEPARIN SODIUM 5000 UNIT(S): 5000 INJECTION INTRAVENOUS; SUBCUTANEOUS at 10:10

## 2019-01-17 RX ADMIN — Medication 325 MILLIGRAM(S): at 06:43

## 2019-01-17 RX ADMIN — Medication 300 MILLIGRAM(S): at 22:24

## 2019-01-17 RX ADMIN — FAMOTIDINE 20 MILLIGRAM(S): 10 INJECTION INTRAVENOUS at 06:43

## 2019-01-17 RX ADMIN — Medication 100 MILLIGRAM(S): at 02:39

## 2019-01-17 RX ADMIN — Medication 300 MILLIGRAM(S): at 14:02

## 2019-01-17 RX ADMIN — Medication 975 MILLIGRAM(S): at 01:45

## 2019-01-17 RX ADMIN — HEPARIN SODIUM 5000 UNIT(S): 5000 INJECTION INTRAVENOUS; SUBCUTANEOUS at 22:24

## 2019-01-17 RX ADMIN — Medication 600 MILLIGRAM(S): at 23:52

## 2019-01-17 RX ADMIN — Medication 600 MILLIGRAM(S): at 00:49

## 2019-01-17 RX ADMIN — Medication 600 MILLIGRAM(S): at 06:43

## 2019-01-17 RX ADMIN — Medication 975 MILLIGRAM(S): at 23:52

## 2019-01-17 RX ADMIN — Medication 325 MILLIGRAM(S): at 18:18

## 2019-01-17 RX ADMIN — Medication 975 MILLIGRAM(S): at 12:28

## 2019-01-17 RX ADMIN — Medication 600 MILLIGRAM(S): at 13:10

## 2019-01-17 RX ADMIN — Medication 975 MILLIGRAM(S): at 07:15

## 2019-01-17 RX ADMIN — Medication 100 MILLIGRAM(S): at 18:18

## 2019-01-17 RX ADMIN — Medication 300 MILLIGRAM(S): at 04:58

## 2019-01-17 RX ADMIN — Medication 600 MILLIGRAM(S): at 12:28

## 2019-01-17 RX ADMIN — Medication 100 MILLIGRAM(S): at 06:43

## 2019-01-17 RX ADMIN — Medication 600 MILLIGRAM(S): at 07:15

## 2019-01-17 RX ADMIN — Medication 975 MILLIGRAM(S): at 00:49

## 2019-01-17 NOTE — PROGRESS NOTE ADULT - PROBLEM SELECTOR PLAN 1
CV: Hemodynamically stable, continue to monitor VS. C/w Labetalol 200 TID for BP control.  Pulm: Saturating well on RA. Increase incentive spirometry.  GI: Advance diet as tolerated  : Voiding  Heme: Continue HSQ/Venodynes for DVT ppx. Increase OOB.    Neuro: PCEA for pain control. Transition to PO pain meds today. S/p Mg for seizure pp x 24 hrs.    Gary, pgy3

## 2019-01-17 NOTE — PROGRESS NOTE ADULT - ASSESSMENT
38y female POD#2, s/p pLTCS for TIUP/sPEC. Pt now s/p Mg x 24hrs for pp seizure ppx. Doing well this AM.

## 2019-01-17 NOTE — PROGRESS NOTE ADULT - PROVIDER SPECIALTY LIST ADULT
Home care nurse Estela with [facility] contacted staff for update regarding pt. [phone]   OB

## 2019-01-17 NOTE — PROGRESS NOTE ADULT - SUBJECTIVE AND OBJECTIVE BOX
OB Progress Note POD #2    Subjective:   Pt seen and examined at bedside. No events overnight. Pain well controlled. Patient ambulating. Passing flatus. Tolerating regular diet. Pt denies fever, chills, chest pain, SOB, nausea, vomiting, lightheadedness, dizziness. Pt denies any HA, changes in vision, RUQ pain. No other complaints at this time.      Objective:  T(F): 98.2 (01-17-19 @ 04:56), Max: 98.6 (01-16-19 @ 17:55)  HR: 73 (01-17-19 @ 04:56) (71 - 76)  BP: 152/90 (01-17-19 @ 05:06) (134/84 - 168/96)  RR: 18 (01-17-19 @ 04:56) (18 - 18)  SpO2: 98% (01-17-19 @ 04:56) (96% - 98%)    I&O's Summary    15 Murphy 2019 07:01  -  16 Jan 2019 07:00  --------------------------------------------------------  IN: 2560 mL / OUT: 1200 mL / NET: 1360 mL    16 Jan 2019 07:01  -  17 Jan 2019 05:42  --------------------------------------------------------  IN: 0 mL / OUT: 1900 mL / NET: -1900 mL      MEDICATIONS  (STANDING):  acetaminophen   Tablet .. 975 milliGRAM(s) Oral every 6 hours  diphtheria/tetanus/pertussis (acellular) Vaccine (ADAcel) 0.5 milliLiter(s) IntraMuscular once  docusate sodium 100 milliGRAM(s) Oral two times a day  famotidine    Tablet 20 milliGRAM(s) Oral two times a day  fentaNYL (3 MICROgram(s)/mL) + BUpivacaine 0.01% in 0.9% Sodium Chloride PCEA 250 milliLiter(s) Epidural PCA Continuous  ferrous    sulfate 325 milliGRAM(s) Oral two times a day  heparin  Injectable 5000 Unit(s) SubCutaneous every 12 hours  ibuprofen  Tablet. 600 milliGRAM(s) Oral every 6 hours  labetalol 300 milliGRAM(s) Oral every 8 hours  magnesium sulfate Infusion 1.5 Gm/Hr (37.5 mL/Hr) IV Continuous <Continuous>  misoprostol Oral Solution 40 MICROGram(s) Oral every 2 hours  misoprostol Oral Solution 60 MICROGram(s) Oral every 2 hours  oxyCODONE    IR 5 milliGRAM(s) Oral every 3 hours  oxytocin Infusion 333.333 milliUNIT(s)/Min (1000 mL/Hr) IV Continuous <Continuous>  oxytocin Infusion 41.667 milliUNIT(s)/Min (125 mL/Hr) IV Continuous <Continuous>  prenatal multivitamin 1 Tablet(s) Oral daily    MEDICATIONS  (PRN):  acetaminophen   Tablet .. 975 milliGRAM(s) Oral every 6 hours PRN Mild Pain (1 - 3)  dexamethasone  Injectable 4 milliGRAM(s) IV Push every 6 hours PRN Nausea, IF ondansetron is ineffective after 30 - 60 minutes  diphenhydrAMINE 25 milliGRAM(s) Oral every 6 hours PRN Itching  fentaNYL (3 MICROgram(s)/mL) + BUpivacaine 0.01% in 0.9% Sodium Chloride PCEA Rescue Clinician Bolus 5 milliLiter(s) Epidural every 15 minutes PRN Moderate Pain (4 - 6)  glycerin Suppository - Adult 1 Suppository(s) Rectal at bedtime PRN Constipation  lanolin Ointment 1 Application(s) Topical every 3 hours PRN Sore Nipples  naloxone Injectable 0.1 milliGRAM(s) IV Push every 3 minutes PRN For ANY of the following changes in patient status:  A. RR LESS THAN 10 breaths per minute, B. Oxygen saturation LESS THAN 90%, C. Sedation score of 6  ondansetron Injectable 4 milliGRAM(s) IV Push every 6 hours PRN Nausea  oxyCODONE    IR 5 milliGRAM(s) Oral every 4 hours PRN Severe Pain (7 - 10)  simethicone 80 milliGRAM(s) Chew every 4 hours PRN Gas      Physical Exam:  Constitutional: NAD, A+O x3  Abdomen: soft, nondistended, no guarding, no rebound  Incision: Pfannenstiel incision - clean, dry, intact  Extremities: no lower extremity edema or calf tenderness bilaterally; venodynes in place    LABS:    01-16    135    |  102    |  9      ----------------------------<  80     4.1     |  22     |  0.80     Ca    7.9<L>      16 Jan 2019 07:06  Mg     4.3       01-15    TPro  5.0<L>  /  Alb  2.8<L>  /  TBili  0.1<L>  /  DBili  x      /  AST  14     /  ALT  11     /  AlkPhos  133<H>  01-16        PT/INR - ( 16 Jan 2019 07:06 )   PT: 10.0 sec;   INR: 0.88 ratio         PTT - ( 16 Jan 2019 07:06 )  PTT:24.1 sec

## 2019-01-18 LAB
HCT VFR BLD CALC: 24.7 % — LOW (ref 34.5–45)
HGB BLD-MCNC: 8.4 G/DL — LOW (ref 11.5–15.5)
MCHC RBC-ENTMCNC: 30.3 PG — SIGNIFICANT CHANGE UP (ref 27–34)
MCHC RBC-ENTMCNC: 33.9 GM/DL — SIGNIFICANT CHANGE UP (ref 32–36)
MCV RBC AUTO: 89.3 FL — SIGNIFICANT CHANGE UP (ref 80–100)
PLATELET # BLD AUTO: 152 K/UL — SIGNIFICANT CHANGE UP (ref 150–400)
RBC # BLD: 2.77 M/UL — LOW (ref 3.8–5.2)
RBC # FLD: 15.3 % — HIGH (ref 10.3–14.5)
WBC # BLD: 7 K/UL — SIGNIFICANT CHANGE UP (ref 3.8–10.5)
WBC # FLD AUTO: 7 K/UL — SIGNIFICANT CHANGE UP (ref 3.8–10.5)

## 2019-01-18 RX ORDER — ASPIRIN/CALCIUM CARB/MAGNESIUM 324 MG
81 TABLET ORAL DAILY
Qty: 0 | Refills: 0 | Status: DISCONTINUED | OUTPATIENT
Start: 2019-01-18 | End: 2019-01-22

## 2019-01-18 RX ORDER — NIFEDIPINE 30 MG
60 TABLET, EXTENDED RELEASE 24 HR ORAL DAILY
Qty: 0 | Refills: 0 | Status: DISCONTINUED | OUTPATIENT
Start: 2019-01-18 | End: 2019-01-19

## 2019-01-18 RX ORDER — LABETALOL HCL 100 MG
400 TABLET ORAL EVERY 8 HOURS
Qty: 0 | Refills: 0 | Status: DISCONTINUED | OUTPATIENT
Start: 2019-01-18 | End: 2019-01-21

## 2019-01-18 RX ORDER — LABETALOL HCL 100 MG
100 TABLET ORAL ONCE
Qty: 0 | Refills: 0 | Status: COMPLETED | OUTPATIENT
Start: 2019-01-18 | End: 2019-01-18

## 2019-01-18 RX ADMIN — HEPARIN SODIUM 5000 UNIT(S): 5000 INJECTION INTRAVENOUS; SUBCUTANEOUS at 12:02

## 2019-01-18 RX ADMIN — Medication 30 MILLIGRAM(S): at 06:18

## 2019-01-18 RX ADMIN — Medication 600 MILLIGRAM(S): at 13:05

## 2019-01-18 RX ADMIN — Medication 325 MILLIGRAM(S): at 18:35

## 2019-01-18 RX ADMIN — Medication 975 MILLIGRAM(S): at 07:00

## 2019-01-18 RX ADMIN — Medication 600 MILLIGRAM(S): at 06:18

## 2019-01-18 RX ADMIN — Medication 600 MILLIGRAM(S): at 13:49

## 2019-01-18 RX ADMIN — HEPARIN SODIUM 5000 UNIT(S): 5000 INJECTION INTRAVENOUS; SUBCUTANEOUS at 21:21

## 2019-01-18 RX ADMIN — Medication 400 MILLIGRAM(S): at 21:21

## 2019-01-18 RX ADMIN — Medication 600 MILLIGRAM(S): at 07:00

## 2019-01-18 RX ADMIN — Medication 975 MILLIGRAM(S): at 13:48

## 2019-01-18 RX ADMIN — Medication 975 MILLIGRAM(S): at 18:35

## 2019-01-18 RX ADMIN — FAMOTIDINE 20 MILLIGRAM(S): 10 INJECTION INTRAVENOUS at 06:18

## 2019-01-18 RX ADMIN — Medication 600 MILLIGRAM(S): at 00:36

## 2019-01-18 RX ADMIN — Medication 400 MILLIGRAM(S): at 14:11

## 2019-01-18 RX ADMIN — Medication 400 MILLIGRAM(S): at 04:42

## 2019-01-18 RX ADMIN — Medication 975 MILLIGRAM(S): at 06:18

## 2019-01-18 RX ADMIN — Medication 100 MILLIGRAM(S): at 06:18

## 2019-01-18 RX ADMIN — Medication 600 MILLIGRAM(S): at 19:00

## 2019-01-18 RX ADMIN — Medication 100 MILLIGRAM(S): at 02:04

## 2019-01-18 RX ADMIN — Medication 975 MILLIGRAM(S): at 00:37

## 2019-01-18 RX ADMIN — Medication 100 MILLIGRAM(S): at 18:35

## 2019-01-18 RX ADMIN — Medication 600 MILLIGRAM(S): at 18:35

## 2019-01-18 RX ADMIN — Medication 975 MILLIGRAM(S): at 13:05

## 2019-01-18 RX ADMIN — Medication 325 MILLIGRAM(S): at 06:18

## 2019-01-18 RX ADMIN — Medication 975 MILLIGRAM(S): at 19:00

## 2019-01-18 NOTE — PROGRESS NOTE ADULT - SUBJECTIVE AND OBJECTIVE BOX
Section/Postpartum  TORRIE BARKSDALE is a  38y woman   who is now post-operative day:   PAST MEDICAL & SURGICAL HISTORY:    Subjective:  The patient feels well.  She is ambulating.   She is tolerating regular diet.  She denies nausea and vomiting.  She is voiding.  Her pain is controlled.  She reports normal postpartum bleeding.  She is breastfeeding.  She is formula feeding.    Physical exam:    Vital Signs Last 24 Hrs  T(C): 36.7 (2019 06:15), Max: 37 (2019 13:02)  T(F): 98.1 (2019 06:15), Max: 98.6 (2019 13:02)  HR: 68 (2019 06:15) (68 - 83)  BP: 152/82 (2019 06:15) (143/81 - 166/87)  BP(mean): --  RR: 18 (2019 06:15) (18 - 18)  SpO2: 97% (2019 06:15) (97% - 98%)    General: alert and oriented in no acute distress.  Breast: Soft, nontender, not engorged.  Abdomen: Soft, nontender, not distended ,  Uterine fundus is firm and below the umbilicus, BS (+), Flatus (+), Bowel Movement (-)  Incision: Clean, dry, and intact, bandage has been removed  Pelvic: Normal lochia noted  Ext: No calf tenderness  Lochia: not excessive    LABS:                        8.4    7.0   )-----------( 152      ( 2019 06:54 )             24.7       Rubella status: Immune    Blood Type: Type + Screen (19 @ 16:18)    ABO Interpretation: B    Rh Interpretation: Positive    Antibody Screen: Negative                       Allergies    penicillin (Rash)    Intolerances      MEDICATIONS  (STANDING):  acetaminophen   Tablet .. 975 milliGRAM(s) Oral every 6 hours  diphtheria/tetanus/pertussis (acellular) Vaccine (ADAcel) 0.5 milliLiter(s) IntraMuscular once  docusate sodium 100 milliGRAM(s) Oral two times a day  famotidine    Tablet 20 milliGRAM(s) Oral two times a day  ferrous    sulfate 325 milliGRAM(s) Oral two times a day  heparin  Injectable 5000 Unit(s) SubCutaneous every 12 hours  ibuprofen  Tablet. 600 milliGRAM(s) Oral every 6 hours  labetalol 400 milliGRAM(s) Oral every 8 hours  magnesium sulfate Infusion 1.5 Gm/Hr (37.5 mL/Hr) IV Continuous <Continuous>  misoprostol Oral Solution 40 MICROGram(s) Oral every 2 hours  misoprostol Oral Solution 60 MICROGram(s) Oral every 2 hours  NIFEdipine XL 30 milliGRAM(s) Oral daily  oxyCODONE    IR 5 milliGRAM(s) Oral every 3 hours  oxytocin Infusion 333.333 milliUNIT(s)/Min (1000 mL/Hr) IV Continuous <Continuous>  oxytocin Infusion 41.667 milliUNIT(s)/Min (125 mL/Hr) IV Continuous <Continuous>  prenatal multivitamin 1 Tablet(s) Oral daily    MEDICATIONS  (PRN):  acetaminophen   Tablet .. 975 milliGRAM(s) Oral every 6 hours PRN Mild Pain (1 - 3)  diphenhydrAMINE 25 milliGRAM(s) Oral every 6 hours PRN Itching  glycerin Suppository - Adult 1 Suppository(s) Rectal at bedtime PRN Constipation  lanolin Ointment 1 Application(s) Topical every 3 hours PRN Sore Nipples  oxyCODONE    IR 5 milliGRAM(s) Oral every 4 hours PRN Severe Pain (7 - 10)  simethicone 80 milliGRAM(s) Chew every 4 hours PRN Gas        Assessment and Plan  POD # 3 s/p PCS    Doing well.  Encourage ambulation, may shower, routine postop care

## 2019-01-18 NOTE — PROGRESS NOTE ADULT - SUBJECTIVE AND OBJECTIVE BOX
OB Postpartum Note: Primary  Delivery, POD#3    S: 37yo  POD#3 s/p pLTCS. The patient feels well.  Pain is well controlled. She is tolerating a regular diet and passing flatus. She is voiding spontaneously, and ambulating without difficulty. Denies CP/SOB. Denies lightheadedness/dizziness. Denies N/V.    O:  Vitals:  Vital Signs Last 24 Hrs  T(C): 36.9 (2019 01:47), Max: 37 (2019 13:02)  T(F): 98.4 (2019 01:47), Max: 98.6 (2019 13:02)  HR: 72 (2019 04:25) (71 - 83)  BP: 160/81 (2019 04:25) (143/81 - 166/87)  BP(mean): --  RR: 18 (2019 01:47) (18 - 18)  SpO2: 98% (2019 20:59) (96% - 98%)    MEDICATIONS  (STANDING):  acetaminophen   Tablet .. 975 milliGRAM(s) Oral every 6 hours  diphtheria/tetanus/pertussis (acellular) Vaccine (ADAcel) 0.5 milliLiter(s) IntraMuscular once  docusate sodium 100 milliGRAM(s) Oral two times a day  famotidine    Tablet 20 milliGRAM(s) Oral two times a day  ferrous    sulfate 325 milliGRAM(s) Oral two times a day  heparin  Injectable 5000 Unit(s) SubCutaneous every 12 hours  ibuprofen  Tablet. 600 milliGRAM(s) Oral every 6 hours  labetalol 400 milliGRAM(s) Oral every 8 hours  magnesium sulfate Infusion 1.5 Gm/Hr (37.5 mL/Hr) IV Continuous <Continuous>  misoprostol Oral Solution 40 MICROGram(s) Oral every 2 hours  misoprostol Oral Solution 60 MICROGram(s) Oral every 2 hours  NIFEdipine XL 30 milliGRAM(s) Oral daily  oxyCODONE    IR 5 milliGRAM(s) Oral every 3 hours  oxytocin Infusion 333.333 milliUNIT(s)/Min (1000 mL/Hr) IV Continuous <Continuous>  oxytocin Infusion 41.667 milliUNIT(s)/Min (125 mL/Hr) IV Continuous <Continuous>  prenatal multivitamin 1 Tablet(s) Oral daily    MEDICATIONS  (PRN):  acetaminophen   Tablet .. 975 milliGRAM(s) Oral every 6 hours PRN Mild Pain (1 - 3)  diphenhydrAMINE 25 milliGRAM(s) Oral every 6 hours PRN Itching  glycerin Suppository - Adult 1 Suppository(s) Rectal at bedtime PRN Constipation  lanolin Ointment 1 Application(s) Topical every 3 hours PRN Sore Nipples  oxyCODONE    IR 5 milliGRAM(s) Oral every 4 hours PRN Severe Pain (7 - 10)  simethicone 80 milliGRAM(s) Chew every 4 hours PRN Gas      LABS:  Blood type: B Positive  Rubella IgG: RPR: Negative                          8.0<L>   9.6 >-----------< 119<L>    (  @ 07:06 )             24.0<L>                        8.6<L>   11.2<H> >-----------< 111<L>    ( 01-15 @ 10:17 )             24.3<L>    19 @ 07:06      135  |  102  |  9   ----------------------------<  80  4.1   |  22  |  0.80    01-15-19 @ 10:17      134<L>  |  102  |  5<L>  ----------------------------<  122<H>  3.5   |  18<L>  |  0.58        Ca    7.9<L>      2019 07:06  Ca    7.6<L>      15 Murphy 2019 10:17  Mg     4.3<H>     01-15  Mg     5.1<H>     01-15  Mg     4.4<H>     01-15    TPro  5.0<L>  /  Alb  2.8<L>  /  TBili  0.1<L>  /  DBili  x   /  AST  14  /  ALT  11  /  AlkPhos  133<H>  19 @ 07:06  TPro  5.0<L>  /  Alb  2.8<L>  /  TBili  0.2  /  DBili  x   /  AST  19  /  ALT  10  /  AlkPhos  146<H>  01-15-19 @ 10:17          Physical exam:  Gen: NAD  Abdomen: Soft, nontender, no distension , firm uterine fundus at umbilicus.  Incision: Clean, dry, and intact   Pelvic: Normal lochia noted  Ext: No calf tenderness

## 2019-01-18 NOTE — PROGRESS NOTE ADULT - ASSESSMENT
39yo  POD#3 s/p pLTCS. PNC c/b sPEC s/p MgSO4 for seizure ppx. BP were elevated overnight. Patient is stable and is doing well post-operatively.

## 2019-01-18 NOTE — PROGRESS NOTE ADULT - PROBLEM SELECTOR PLAN 1
V: Hemodynamically stable, continue to monitor VS. Labetalol increased to 400 TID for BP control.  Pulm: Saturating well on RA. Increase incentive spirometry.  GI: Advance diet as tolerated  : Voiding  Heme: Continue HSQ/Venodynes for DVT ppx. Increase OOB.    Neuro: C/w po pain meds. S/p Mg for seizure pp x 24 hrs.    RShibata, pgy2

## 2019-01-19 DIAGNOSIS — F41.9 ANXIETY DISORDER, UNSPECIFIED: ICD-10-CM

## 2019-01-19 LAB
ALBUMIN SERPL ELPH-MCNC: 3 G/DL — LOW (ref 3.3–5)
ALP SERPL-CCNC: 117 U/L — SIGNIFICANT CHANGE UP (ref 40–120)
ALT FLD-CCNC: 16 U/L — SIGNIFICANT CHANGE UP (ref 10–45)
ANION GAP SERPL CALC-SCNC: 13 MMOL/L — SIGNIFICANT CHANGE UP (ref 5–17)
APTT BLD: 29.1 SEC — SIGNIFICANT CHANGE UP (ref 27.5–36.3)
AST SERPL-CCNC: 16 U/L — SIGNIFICANT CHANGE UP (ref 10–40)
BASOPHILS # BLD AUTO: 0 K/UL — SIGNIFICANT CHANGE UP (ref 0–0.2)
BASOPHILS NFR BLD AUTO: 0.2 % — SIGNIFICANT CHANGE UP (ref 0–2)
BILIRUB SERPL-MCNC: 0.1 MG/DL — LOW (ref 0.2–1.2)
BUN SERPL-MCNC: 10 MG/DL — SIGNIFICANT CHANGE UP (ref 7–23)
CALCIUM SERPL-MCNC: 8.7 MG/DL — SIGNIFICANT CHANGE UP (ref 8.4–10.5)
CHLORIDE SERPL-SCNC: 105 MMOL/L — SIGNIFICANT CHANGE UP (ref 96–108)
CO2 SERPL-SCNC: 21 MMOL/L — LOW (ref 22–31)
CREAT SERPL-MCNC: 0.59 MG/DL — SIGNIFICANT CHANGE UP (ref 0.5–1.3)
EOSINOPHIL # BLD AUTO: 0.1 K/UL — SIGNIFICANT CHANGE UP (ref 0–0.5)
EOSINOPHIL NFR BLD AUTO: 2 % — SIGNIFICANT CHANGE UP (ref 0–6)
FIBRINOGEN PPP-MCNC: 692 MG/DL — HIGH (ref 350–510)
GLUCOSE SERPL-MCNC: 78 MG/DL — SIGNIFICANT CHANGE UP (ref 70–99)
HCT VFR BLD CALC: 24.4 % — LOW (ref 34.5–45)
HGB BLD-MCNC: 8.4 G/DL — LOW (ref 11.5–15.5)
INR BLD: 0.92 RATIO — SIGNIFICANT CHANGE UP (ref 0.88–1.16)
LDH SERPL L TO P-CCNC: 225 U/L — SIGNIFICANT CHANGE UP (ref 50–242)
LYMPHOCYTES # BLD AUTO: 1.6 K/UL — SIGNIFICANT CHANGE UP (ref 1–3.3)
LYMPHOCYTES # BLD AUTO: 25.7 % — SIGNIFICANT CHANGE UP (ref 13–44)
MCHC RBC-ENTMCNC: 30.9 PG — SIGNIFICANT CHANGE UP (ref 27–34)
MCHC RBC-ENTMCNC: 34.4 GM/DL — SIGNIFICANT CHANGE UP (ref 32–36)
MCV RBC AUTO: 89.6 FL — SIGNIFICANT CHANGE UP (ref 80–100)
MONOCYTES # BLD AUTO: 0.5 K/UL — SIGNIFICANT CHANGE UP (ref 0–0.9)
MONOCYTES NFR BLD AUTO: 8.6 % — SIGNIFICANT CHANGE UP (ref 2–14)
NEUTROPHILS # BLD AUTO: 3.9 K/UL — SIGNIFICANT CHANGE UP (ref 1.8–7.4)
NEUTROPHILS NFR BLD AUTO: 63.5 % — SIGNIFICANT CHANGE UP (ref 43–77)
PLATELET # BLD AUTO: 177 K/UL — SIGNIFICANT CHANGE UP (ref 150–400)
POTASSIUM SERPL-MCNC: 3.8 MMOL/L — SIGNIFICANT CHANGE UP (ref 3.5–5.3)
POTASSIUM SERPL-SCNC: 3.8 MMOL/L — SIGNIFICANT CHANGE UP (ref 3.5–5.3)
PROT SERPL-MCNC: 5.6 G/DL — LOW (ref 6–8.3)
PROTHROM AB SERPL-ACNC: 10.5 SEC — SIGNIFICANT CHANGE UP (ref 10–12.9)
RBC # BLD: 2.72 M/UL — LOW (ref 3.8–5.2)
RBC # FLD: 15.3 % — HIGH (ref 10.3–14.5)
SODIUM SERPL-SCNC: 139 MMOL/L — SIGNIFICANT CHANGE UP (ref 135–145)
URATE SERPL-MCNC: 4.6 MG/DL — SIGNIFICANT CHANGE UP (ref 2.5–7)
WBC # BLD: 6.1 K/UL — SIGNIFICANT CHANGE UP (ref 3.8–10.5)
WBC # FLD AUTO: 6.1 K/UL — SIGNIFICANT CHANGE UP (ref 3.8–10.5)

## 2019-01-19 RX ORDER — SERTRALINE 25 MG/1
50 TABLET, FILM COATED ORAL DAILY
Qty: 0 | Refills: 0 | Status: DISCONTINUED | OUTPATIENT
Start: 2019-01-19 | End: 2019-01-22

## 2019-01-19 RX ORDER — NIFEDIPINE 30 MG
30 TABLET, EXTENDED RELEASE 24 HR ORAL ONCE
Qty: 0 | Refills: 0 | Status: COMPLETED | OUTPATIENT
Start: 2019-01-19 | End: 2019-01-19

## 2019-01-19 RX ORDER — DIPHENHYDRAMINE HCL 50 MG
25 CAPSULE ORAL ONCE
Qty: 0 | Refills: 0 | Status: DISCONTINUED | OUTPATIENT
Start: 2019-01-19 | End: 2019-01-22

## 2019-01-19 RX ORDER — SERTRALINE 25 MG/1
50 TABLET, FILM COATED ORAL DAILY
Qty: 0 | Refills: 0 | Status: DISCONTINUED | OUTPATIENT
Start: 2019-01-19 | End: 2019-01-19

## 2019-01-19 RX ORDER — NIFEDIPINE 30 MG
90 TABLET, EXTENDED RELEASE 24 HR ORAL DAILY
Qty: 0 | Refills: 0 | Status: DISCONTINUED | OUTPATIENT
Start: 2019-01-19 | End: 2019-01-20

## 2019-01-19 RX ORDER — SODIUM CHLORIDE 0.65 %
1 AEROSOL, SPRAY (ML) NASAL
Qty: 0 | Refills: 0 | Status: DISCONTINUED | OUTPATIENT
Start: 2019-01-19 | End: 2019-01-22

## 2019-01-19 RX ORDER — HYDRALAZINE HCL 50 MG
10 TABLET ORAL ONCE
Qty: 0 | Refills: 0 | Status: COMPLETED | OUTPATIENT
Start: 2019-01-19 | End: 2019-01-19

## 2019-01-19 RX ADMIN — Medication 25 MILLIGRAM(S): at 23:35

## 2019-01-19 RX ADMIN — Medication 600 MILLIGRAM(S): at 18:48

## 2019-01-19 RX ADMIN — Medication 600 MILLIGRAM(S): at 12:48

## 2019-01-19 RX ADMIN — Medication 600 MILLIGRAM(S): at 11:48

## 2019-01-19 RX ADMIN — Medication 600 MILLIGRAM(S): at 07:00

## 2019-01-19 RX ADMIN — Medication 30 MILLIGRAM(S): at 22:22

## 2019-01-19 RX ADMIN — Medication 400 MILLIGRAM(S): at 13:07

## 2019-01-19 RX ADMIN — Medication 975 MILLIGRAM(S): at 12:48

## 2019-01-19 RX ADMIN — Medication 325 MILLIGRAM(S): at 18:19

## 2019-01-19 RX ADMIN — SERTRALINE 50 MILLIGRAM(S): 25 TABLET, FILM COATED ORAL at 14:18

## 2019-01-19 RX ADMIN — Medication 600 MILLIGRAM(S): at 06:07

## 2019-01-19 RX ADMIN — Medication 975 MILLIGRAM(S): at 01:06

## 2019-01-19 RX ADMIN — Medication 600 MILLIGRAM(S): at 02:00

## 2019-01-19 RX ADMIN — Medication 100 MILLIGRAM(S): at 18:22

## 2019-01-19 RX ADMIN — Medication 400 MILLIGRAM(S): at 06:07

## 2019-01-19 RX ADMIN — Medication 975 MILLIGRAM(S): at 02:00

## 2019-01-19 RX ADMIN — Medication 100 MILLIGRAM(S): at 06:07

## 2019-01-19 RX ADMIN — Medication 600 MILLIGRAM(S): at 18:20

## 2019-01-19 RX ADMIN — Medication 325 MILLIGRAM(S): at 06:07

## 2019-01-19 RX ADMIN — Medication 975 MILLIGRAM(S): at 21:50

## 2019-01-19 RX ADMIN — FAMOTIDINE 20 MILLIGRAM(S): 10 INJECTION INTRAVENOUS at 06:07

## 2019-01-19 RX ADMIN — Medication 5 MILLIGRAM(S): at 23:00

## 2019-01-19 RX ADMIN — Medication 600 MILLIGRAM(S): at 01:06

## 2019-01-19 RX ADMIN — Medication 10 MILLIGRAM(S): at 22:33

## 2019-01-19 RX ADMIN — Medication 60 MILLIGRAM(S): at 01:27

## 2019-01-19 RX ADMIN — HEPARIN SODIUM 5000 UNIT(S): 5000 INJECTION INTRAVENOUS; SUBCUTANEOUS at 11:51

## 2019-01-19 RX ADMIN — Medication 975 MILLIGRAM(S): at 11:48

## 2019-01-19 RX ADMIN — Medication 400 MILLIGRAM(S): at 21:19

## 2019-01-19 RX ADMIN — Medication 1 TABLET(S): at 11:48

## 2019-01-19 RX ADMIN — Medication 975 MILLIGRAM(S): at 07:00

## 2019-01-19 RX ADMIN — Medication 975 MILLIGRAM(S): at 21:19

## 2019-01-19 RX ADMIN — Medication 975 MILLIGRAM(S): at 06:08

## 2019-01-19 NOTE — CHART NOTE - NSCHARTNOTEFT_GEN_A_CORE
R3 OB Postpartum Note    Patient seen and examined at bedside for severely elevated BP despite Labetalol 400 mg administration.  Patient has no complaints and pain is well-controlled.  Patient denies headache, blurry vision, and epigastric tenderness.  Procardia XL 30 mg PO given.  Hydralazine 10 mg IVP performed, BP improved to 164/90, Hydralazine 5 mg IVP given with normalization of BP.  30 minutes later, received call from RN about new rash.  Patient evaluated by Dr Mackenzie.  Patient complained of worsening ezema-like rash on left arm, trunk, and cheeks.  Patient denies SOB and throat closing.  Benadryl 25 IVP given.    Physical exam:    Vital Signs Last 24 Hrs  T(C): 36.7 (2019 21:08), Max: 36.9 (2019 01:08)  T(F): 98 (2019 21:08), Max: 98.4 (2019 01:08)  HR: 69 (2019 21:08) (64 - 82)  BP: 180/100 (2019 21:15) (132/72 - 180/100)  BP(mean): --  RR: 18 (2019 21:08) (18 - 18)  SpO2: 98% (2019 06:14) (98% - 98%)    Gen: NAD, rash on arms    LABS:                        8.4    6.1   )-----------( 177      ( 2019 06:38 )             24.4                         8.4    7.0   )-----------( 152      ( 2019 06:54 )             24.7       19 @ 06:38      139  |  105  |  10  ----------------------------<  78  3.8   |  21<L>  |  0.59        Ca    8.7      2019 06:38    TPro  5.6<L>  /  Alb  3.0<L>  /  TBili  0.1<L>  /  DBili  x   /  AST  16  /  ALT  16  /  AlkPhos  117  19 @ 06:38    37 yo  POD4 sp pLTCS with sPEC.  BPs severely elevated but responded to hydralazine.  Patient had rash after hydralazine but no signs of anaphylaxis.  -Continue to Monitor BPs and symptoms  -Increase procardia to 90 XL qd, continue Labetalol 400 TID  -Continue to monitor for worsening signs of medication reaction  -Routine post-op care    dw Dr Leo Crump PGY3

## 2019-01-19 NOTE — PROGRESS NOTE ADULT - SUBJECTIVE AND OBJECTIVE BOX
Section/Postpartum  TORRIE BARKSDALE is a  38y woman   who is now post-operative day: 4  PAST MEDICAL & SURGICAL HISTORY:    Subjective:  The patient feels well.  She is ambulating.   She is tolerating regular diet.  She denies nausea and vomiting.  She is voiding.  Her pain is controlled.  She reports normal postpartum bleeding.  She is breastfeeding.  She is formula feeding.    Physical exam:    Vital Signs Last 24 Hrs  T(C): 36.7 (2019 12:48), Max: 37 (2019 18:10)  T(F): 98.1 (2019 12:48), Max: 98.6 (2019 18:10)  HR: 67 (2019 12:48) (64 - 82)  BP: 160/88 (2019 12:48) (132/80 - 175/97)  BP(mean): --  RR: 18 (2019 12:48) (18 - 18)  SpO2: 98% (2019 06:14) (98% - 98%)    General: alert and oriented in no acute distress.  Breast: Soft, nontender, not engorged.  Abdomen: Soft, nontender, not distended ,  Uterine fundus is firm and at below the umbilicus, BS (+), Flatus (+), Bowel Movement (+)  Incision: Clean, dry, and intact, bandage has been removed  Pelvic: Normal lochia noted  Ext: No calf tenderness  Lochia: not excessive  Whitehead draining clear yellow urine    LABS:                        8.4    6.1   )-----------( 177      ( 2019 06:38 )             24.4       Rubella status:  Immune    Blood Type: Type + Screen (19 @ 16:18)    ABO Interpretation: B    Rh Interpretation: Positive    Antibody Screen: Negative                     Allergies    penicillin (Rash)    Intolerances      MEDICATIONS  (STANDING):  acetaminophen   Tablet .. 975 milliGRAM(s) Oral every 6 hours  aspirin  chewable 81 milliGRAM(s) Oral daily  diphtheria/tetanus/pertussis (acellular) Vaccine (ADAcel) 0.5 milliLiter(s) IntraMuscular once  docusate sodium 100 milliGRAM(s) Oral two times a day  famotidine    Tablet 20 milliGRAM(s) Oral two times a day  ferrous    sulfate 325 milliGRAM(s) Oral two times a day  heparin  Injectable 5000 Unit(s) SubCutaneous every 12 hours  ibuprofen  Tablet. 600 milliGRAM(s) Oral every 6 hours  labetalol 400 milliGRAM(s) Oral every 8 hours  magnesium sulfate Infusion 1.5 Gm/Hr (37.5 mL/Hr) IV Continuous <Continuous>  misoprostol Oral Solution 40 MICROGram(s) Oral every 2 hours  misoprostol Oral Solution 60 MICROGram(s) Oral every 2 hours  NIFEdipine XL 60 milliGRAM(s) Oral daily  oxyCODONE    IR 5 milliGRAM(s) Oral every 3 hours  oxytocin Infusion 333.333 milliUNIT(s)/Min (1000 mL/Hr) IV Continuous <Continuous>  oxytocin Infusion 41.667 milliUNIT(s)/Min (125 mL/Hr) IV Continuous <Continuous>  prenatal multivitamin 1 Tablet(s) Oral daily  sertraline 50 milliGRAM(s) Oral daily    MEDICATIONS  (PRN):  acetaminophen   Tablet .. 975 milliGRAM(s) Oral every 6 hours PRN Mild Pain (1 - 3)  diphenhydrAMINE 25 milliGRAM(s) Oral every 6 hours PRN Itching  glycerin Suppository - Adult 1 Suppository(s) Rectal at bedtime PRN Constipation  lanolin Ointment 1 Application(s) Topical every 3 hours PRN Sore Nipples  oxyCODONE    IR 5 milliGRAM(s) Oral every 4 hours PRN Severe Pain (7 - 10)  simethicone 80 milliGRAM(s) Chew every 4 hours PRN Gas  sodium chloride 0.65% Nasal 1 Spray(s) Both Nostrils every 3 hours PRN Nasal Congestion        Assessment and Plan  POD # 4  s/p  PCS     Doing well, but BP remains elevated. Medications have been alter and will continue to observe. Pt has a history of anxiety and is experiencing some postpartum blues and anxiety. We discussed going back on Zoloft, which is what she has used in the past. She has agreed to starting it.  Encourage ambulation, may shower, routine postop care.

## 2019-01-19 NOTE — PROVIDER CONTACT NOTE (CHANGE IN STATUS NOTIFICATION) - ACTION/TREATMENT ORDERED:
Dr Dhillon said to recheck the BP in two hours after labetalol.
NO ORDERS RECEIVED AT THIS TIME.
As per Dr Dhillon's order labetalol 100mg given.
Dr Dhillon ordered to give 6am dose of labetalol early and recheck in an hour.
Dr Arellano aware, she stated to repeat BP in 15 mins

## 2019-01-19 NOTE — PROGRESS NOTE ADULT - PROBLEM SELECTOR PLAN 2
- Monitor BPs after increasing labetalol yesterday and procardia this AM  - AM HELLP labs, all labs have been WNL to date  - home BP monitoring d/w patient, and she was advised to obtain home BP cuff. the importance of staying inpatient for BP control and the risk of stroke was discussed with the patient  Alessandra Montgomery PGY3

## 2019-01-19 NOTE — PROGRESS NOTE ADULT - PROBLEM SELECTOR PLAN 1
- Continue with po analgesia  - Increase ambulation  - Continue regular diet  - IV lock  - f/u AM [facility] labs

## 2019-01-19 NOTE — PROGRESS NOTE ADULT - ASSESSMENT
38  POD#4 from pLTCS for TIUP with sPEC . She received 24 hrs of magnesium postpartum. BP were noted to be elevated on PPD#2 and she has received increasing doses of labetalol and procardia, currently on 400 TID labetalol and procardia 60 XL. Will repeat HELLP labs this AM and continue to monitor BPs for improved control.

## 2019-01-19 NOTE — PROVIDER CONTACT NOTE (CHANGE IN STATUS NOTIFICATION) - SITUATION
high BP [ see previous provider contact note.]
/74, Pulse 78
/81, Pulse 72
/82 , PULSE 68
pt's /97, HR 69

## 2019-01-19 NOTE — PROGRESS NOTE ADULT - SUBJECTIVE AND OBJECTIVE BOX
POD#4  Patient seen and examined at bedside. Overnight elevated BPs were noted and procardia 60XL given at 2am. No acute complaints, pain well controlled. Denies headache, visual changes, CP/SOB, NV, RUQ pain. She is tearful at the prospect of not being able to be discharged today.   Patient is ambulating and tolerating regular diet. Passing flatus and voiding. Pt is breast and bottle feeding her babies.    Vital Signs Last 24 Hours  T(C): 36.9 (01-19-19 @ 01:08), Max: 37 (01-18-19 @ 18:10)  HR: 69 (01-19-19 @ 02:39) (66 - 86)  BP: 157/78 (01-19-19 @ 02:39) (147/88 - 169/84)  RR: 18 (01-19-19 @ 01:08) (18 - 18)  SpO2: 98% (01-18-19 @ 21:40) (97% - 98%)        Physical Exam:  General: NAD  Abdomen: Soft, non-tender, non-distended, fundus firm  Incision: Pfannenstiel incision CDI, subcuticular suture closure  Pelvic: Lochia wnl  Ext: NTBL  Labs:  Blood type: B Positive  Rubella IgG: RPR: Negative                          8.4<L>   7.0 >-----------< 152    ( 01-18 @ 06:54 )             24.7<L>                        8.0<L>   9.6 >-----------< 119<L>    ( 01-16 @ 07:06 )             24.0<L>    01-16-19 @ 07:06      135  |  102  |  9   ----------------------------<  80  4.1   |  22  |  0.80        Ca    7.9<L>      16 Jan 2019 07:06    TPro  5.0<L>  /  Alb  2.8<L>  /  TBili  0.1<L>  /  DBili  x   /  AST  14  /  ALT  11  /  AlkPhos  133<H>  01-16-19 @ 07:06          MEDICATIONS  (STANDING):  acetaminophen   Tablet .. 975 milliGRAM(s) Oral every 6 hours  aspirin  chewable 81 milliGRAM(s) Oral daily  diphtheria/tetanus/pertussis (acellular) Vaccine (ADAcel) 0.5 milliLiter(s) IntraMuscular once  docusate sodium 100 milliGRAM(s) Oral two times a day  famotidine    Tablet 20 milliGRAM(s) Oral two times a day  ferrous    sulfate 325 milliGRAM(s) Oral two times a day  heparin  Injectable 5000 Unit(s) SubCutaneous every 12 hours  ibuprofen  Tablet. 600 milliGRAM(s) Oral every 6 hours  labetalol 400 milliGRAM(s) Oral every 8 hours  magnesium sulfate Infusion 1.5 Gm/Hr (37.5 mL/Hr) IV Continuous <Continuous>  misoprostol Oral Solution 40 MICROGram(s) Oral every 2 hours  misoprostol Oral Solution 60 MICROGram(s) Oral every 2 hours  NIFEdipine XL 60 milliGRAM(s) Oral daily  oxyCODONE    IR 5 milliGRAM(s) Oral every 3 hours  oxytocin Infusion 333.333 milliUNIT(s)/Min (1000 mL/Hr) IV Continuous <Continuous>  oxytocin Infusion 41.667 milliUNIT(s)/Min (125 mL/Hr) IV Continuous <Continuous>  prenatal multivitamin 1 Tablet(s) Oral daily    MEDICATIONS  (PRN):  acetaminophen   Tablet .. 975 milliGRAM(s) Oral every 6 hours PRN Mild Pain (1 - 3)  diphenhydrAMINE 25 milliGRAM(s) Oral every 6 hours PRN Itching  glycerin Suppository - Adult 1 Suppository(s) Rectal at bedtime PRN Constipation  lanolin Ointment 1 Application(s) Topical every 3 hours PRN Sore Nipples  oxyCODONE    IR 5 milliGRAM(s) Oral every 4 hours PRN Severe Pain (7 - 10)  simethicone 80 milliGRAM(s) Chew every 4 hours PRN Gas

## 2019-01-20 LAB
BASOPHILS # BLD AUTO: 0 K/UL — SIGNIFICANT CHANGE UP (ref 0–0.2)
BASOPHILS NFR BLD AUTO: 0.4 % — SIGNIFICANT CHANGE UP (ref 0–2)
EOSINOPHIL # BLD AUTO: 0.2 K/UL — SIGNIFICANT CHANGE UP (ref 0–0.5)
EOSINOPHIL NFR BLD AUTO: 2 % — SIGNIFICANT CHANGE UP (ref 0–6)
HCT VFR BLD CALC: 29.7 % — LOW (ref 34.5–45)
HGB BLD-MCNC: 10 G/DL — LOW (ref 11.5–15.5)
LYMPHOCYTES # BLD AUTO: 2.1 K/UL — SIGNIFICANT CHANGE UP (ref 1–3.3)
LYMPHOCYTES # BLD AUTO: 26.4 % — SIGNIFICANT CHANGE UP (ref 13–44)
MCHC RBC-ENTMCNC: 30.2 PG — SIGNIFICANT CHANGE UP (ref 27–34)
MCHC RBC-ENTMCNC: 33.5 GM/DL — SIGNIFICANT CHANGE UP (ref 32–36)
MCV RBC AUTO: 90.1 FL — SIGNIFICANT CHANGE UP (ref 80–100)
MONOCYTES # BLD AUTO: 0.6 K/UL — SIGNIFICANT CHANGE UP (ref 0–0.9)
MONOCYTES NFR BLD AUTO: 8 % — SIGNIFICANT CHANGE UP (ref 2–14)
NEUTROPHILS # BLD AUTO: 5.1 K/UL — SIGNIFICANT CHANGE UP (ref 1.8–7.4)
NEUTROPHILS NFR BLD AUTO: 63.2 % — SIGNIFICANT CHANGE UP (ref 43–77)
PLATELET # BLD AUTO: 252 K/UL — SIGNIFICANT CHANGE UP (ref 150–400)
RBC # BLD: 3.3 M/UL — LOW (ref 3.8–5.2)
RBC # FLD: 15.5 % — HIGH (ref 10.3–14.5)
WBC # BLD: 8 K/UL — SIGNIFICANT CHANGE UP (ref 3.8–10.5)
WBC # FLD AUTO: 8 K/UL — SIGNIFICANT CHANGE UP (ref 3.8–10.5)

## 2019-01-20 RX ORDER — NIFEDIPINE 30 MG
30 TABLET, EXTENDED RELEASE 24 HR ORAL ONCE
Qty: 0 | Refills: 0 | Status: COMPLETED | OUTPATIENT
Start: 2019-01-20 | End: 2019-01-20

## 2019-01-20 RX ORDER — NIFEDIPINE 30 MG
30 TABLET, EXTENDED RELEASE 24 HR ORAL EVERY 24 HOURS
Qty: 0 | Refills: 0 | Status: DISCONTINUED | OUTPATIENT
Start: 2019-01-21 | End: 2019-01-22

## 2019-01-20 RX ORDER — HYDRALAZINE HCL 50 MG
10 TABLET ORAL ONCE
Qty: 0 | Refills: 0 | Status: COMPLETED | OUTPATIENT
Start: 2019-01-20 | End: 2019-01-20

## 2019-01-20 RX ORDER — NIFEDIPINE 30 MG
90 TABLET, EXTENDED RELEASE 24 HR ORAL EVERY 24 HOURS
Qty: 0 | Refills: 0 | Status: DISCONTINUED | OUTPATIENT
Start: 2019-01-21 | End: 2019-01-22

## 2019-01-20 RX ORDER — HYDRALAZINE HCL 50 MG
5 TABLET ORAL ONCE
Qty: 0 | Refills: 0 | Status: COMPLETED | OUTPATIENT
Start: 2019-01-20 | End: 2019-01-19

## 2019-01-20 RX ADMIN — Medication 600 MILLIGRAM(S): at 19:46

## 2019-01-20 RX ADMIN — Medication 400 MILLIGRAM(S): at 05:40

## 2019-01-20 RX ADMIN — Medication 975 MILLIGRAM(S): at 05:40

## 2019-01-20 RX ADMIN — Medication 1 TABLET(S): at 14:08

## 2019-01-20 RX ADMIN — Medication 600 MILLIGRAM(S): at 15:00

## 2019-01-20 RX ADMIN — FAMOTIDINE 20 MILLIGRAM(S): 10 INJECTION INTRAVENOUS at 17:44

## 2019-01-20 RX ADMIN — Medication 975 MILLIGRAM(S): at 18:30

## 2019-01-20 RX ADMIN — Medication 975 MILLIGRAM(S): at 17:44

## 2019-01-20 RX ADMIN — Medication 90 MILLIGRAM(S): at 01:50

## 2019-01-20 RX ADMIN — Medication 10 MILLIGRAM(S): at 22:33

## 2019-01-20 RX ADMIN — Medication 600 MILLIGRAM(S): at 09:09

## 2019-01-20 RX ADMIN — Medication 975 MILLIGRAM(S): at 23:46

## 2019-01-20 RX ADMIN — Medication 975 MILLIGRAM(S): at 13:11

## 2019-01-20 RX ADMIN — Medication 600 MILLIGRAM(S): at 01:30

## 2019-01-20 RX ADMIN — Medication 975 MILLIGRAM(S): at 06:14

## 2019-01-20 RX ADMIN — Medication 100 MILLIGRAM(S): at 05:39

## 2019-01-20 RX ADMIN — Medication 81 MILLIGRAM(S): at 12:40

## 2019-01-20 RX ADMIN — Medication 325 MILLIGRAM(S): at 05:39

## 2019-01-20 RX ADMIN — Medication 30 MILLIGRAM(S): at 22:37

## 2019-01-20 RX ADMIN — Medication 600 MILLIGRAM(S): at 20:15

## 2019-01-20 RX ADMIN — Medication 400 MILLIGRAM(S): at 14:08

## 2019-01-20 RX ADMIN — Medication 600 MILLIGRAM(S): at 09:58

## 2019-01-20 RX ADMIN — Medication 100 MILLIGRAM(S): at 17:44

## 2019-01-20 RX ADMIN — HEPARIN SODIUM 5000 UNIT(S): 5000 INJECTION INTRAVENOUS; SUBCUTANEOUS at 00:26

## 2019-01-20 RX ADMIN — SERTRALINE 50 MILLIGRAM(S): 25 TABLET, FILM COATED ORAL at 10:21

## 2019-01-20 RX ADMIN — Medication 325 MILLIGRAM(S): at 17:45

## 2019-01-20 RX ADMIN — Medication 600 MILLIGRAM(S): at 14:08

## 2019-01-20 RX ADMIN — Medication 400 MILLIGRAM(S): at 21:37

## 2019-01-20 RX ADMIN — Medication 975 MILLIGRAM(S): at 12:42

## 2019-01-20 RX ADMIN — Medication 600 MILLIGRAM(S): at 00:27

## 2019-01-20 NOTE — PROGRESS NOTE ADULT - PROBLEM SELECTOR PLAN 1
-Continue to monitor BPs and Symptoms  -c/w Labetalol 400 TID and Procardia 90XL  -cw Zoloft for anxiety  -Continue with regular diet  -Heparin, SCDs, and ambulation for DVT ppx  -Analgesia as needed    SONAL Crump PGY3

## 2019-01-20 NOTE — PROGRESS NOTE ADULT - ASSESSMENT
37 yo  POD#5 sp pLTCS for arrest with sPEC and di/di TIUP.  Patient is now s/p magnesium for seizure prophylaxis and remains asymptomatic.  Blood pressures were severely elevated overnight, responded to IV hydralazine, and are now well-controlled on Labetalol 400 TID and Procardia 90XL.  Patient is hemodynamically stable with pain well-controlled.

## 2019-01-20 NOTE — PROGRESS NOTE ADULT - ASSESSMENT
Pt had BP spike overnight that required two doses of Hydralazine. She was started on Procardia XL 90 mg this morning with the Labetalol 400 mg q8H. BP this am are much improved. will continue to observe.

## 2019-01-20 NOTE — PROGRESS NOTE ADULT - SUBJECTIVE AND OBJECTIVE BOX
Section/Postpartum  TORRIE BARKSDALE is a  38y woman , who is now post-operative day: 5  PAST MEDICAL & SURGICAL HISTORY:    Subjective:  The patient feels well.  She is ambulating.   She is tolerating regular diet.  She denies nausea and vomiting.  She is voiding.  Her pain is controlled.  She reports normal postpartum bleeding.  She is breastfeeding.  She is formula feeding.    Physical exam:    Vital Signs Last 24 Hrs  T(C): 36.8 (2019 09:39), Max: 37.1 (2019 05:22)  T(F): 98.2 (2019 09:39), Max: 98.8 (2019 05:22)  HR: 75 (2019 09:39) (61 - 96)  BP: 130/76 (2019 09:39) (130/76 - 187/107)  BP(mean): --  RR: 18 (2019 09:39) (18 - 18)  SpO2: 98% (2019 09:39) (96% - 98%)    General: alert and oriented in no acute distress.  Breast: Soft, nontender, not engorged.  Abdomen: Soft, nontender, not distended ,  Uterine fundus is firm and at below the umbilicus, BS (+), Flatus (+), Bowel Movement (+)  Incision: Clean, dry, and intact, bandage has been removed  Pelvic: Normal lochia noted  Ext: No calf tenderness  Lochia: not excessive    LABS:                        8.4    6.1   )-----------( 177      ( 2019 06:38 )             24.4       Rubella status:  Immune    Blood Type: Type + Screen (19 @ 16:18)    ABO Interpretation: B    Rh Interpretation: Positive    Antibody Screen: Negative                         Allergies    penicillin (Rash)    Intolerances      MEDICATIONS  (STANDING):  acetaminophen   Tablet .. 975 milliGRAM(s) Oral every 6 hours  aspirin  chewable 81 milliGRAM(s) Oral daily  diphenhydrAMINE   Injectable 25 milliGRAM(s) IV Push once  diphtheria/tetanus/pertussis (acellular) Vaccine (ADAcel) 0.5 milliLiter(s) IntraMuscular once  docusate sodium 100 milliGRAM(s) Oral two times a day  famotidine    Tablet 20 milliGRAM(s) Oral two times a day  ferrous    sulfate 325 milliGRAM(s) Oral two times a day  heparin  Injectable 5000 Unit(s) SubCutaneous every 12 hours  ibuprofen  Tablet. 600 milliGRAM(s) Oral every 6 hours  labetalol 400 milliGRAM(s) Oral every 8 hours  magnesium sulfate Infusion 1.5 Gm/Hr (37.5 mL/Hr) IV Continuous <Continuous>  misoprostol Oral Solution 40 MICROGram(s) Oral every 2 hours  misoprostol Oral Solution 60 MICROGram(s) Oral every 2 hours  NIFEdipine XL 90 milliGRAM(s) Oral daily  oxyCODONE    IR 5 milliGRAM(s) Oral every 3 hours  oxytocin Infusion 333.333 milliUNIT(s)/Min (1000 mL/Hr) IV Continuous <Continuous>  oxytocin Infusion 41.667 milliUNIT(s)/Min (125 mL/Hr) IV Continuous <Continuous>  prenatal multivitamin 1 Tablet(s) Oral daily  sertraline 50 milliGRAM(s) Oral daily    MEDICATIONS  (PRN):  acetaminophen   Tablet .. 975 milliGRAM(s) Oral every 6 hours PRN Mild Pain (1 - 3)  diphenhydrAMINE 25 milliGRAM(s) Oral every 6 hours PRN Itching  glycerin Suppository - Adult 1 Suppository(s) Rectal at bedtime PRN Constipation  lanolin Ointment 1 Application(s) Topical every 3 hours PRN Sore Nipples  oxyCODONE    IR 5 milliGRAM(s) Oral every 4 hours PRN Severe Pain (7 - 10)  simethicone 80 milliGRAM(s) Chew every 4 hours PRN Gas  sodium chloride 0.65% Nasal 1 Spray(s) Both Nostrils every 3 hours PRN Nasal Congestion        Assessment and Plan  POD # 5  s/p PCS    Doing well.  Encourage ambulation, may shower, routine postop care

## 2019-01-20 NOTE — PROGRESS NOTE ADULT - SUBJECTIVE AND OBJECTIVE BOX
R3 OB Postpartum Note    Patient seen and examined at bedside.  Patient with asymptomatic severe range blood pressures overnight, now resolved.  Patient has no complaints and pain is well-controlled.  Patient denies headache, blurry vision, and epigastric pain.  Patient is tolerating regular diet, passing flatus, ambulating, and is voiding spontaneously / has a galvez catheter in place.  Postpartum bleeding is well controlled.      Physical exam:    Vital Signs Last 24 Hrs  T(C): 36.6 (20 Jan 2019 01:48), Max: 36.9 (19 Jan 2019 06:14)  T(F): 97.9 (20 Jan 2019 01:48), Max: 98.4 (19 Jan 2019 06:14)  HR: 62 (20 Jan 2019 01:48) (61 - 96)  BP: 146/78 (20 Jan 2019 01:48) (132/72 - 187/107)  BP(mean): --  RR: 18 (20 Jan 2019 01:48) (18 - 18)  SpO2: 97% (20 Jan 2019 01:48) (97% - 98%)      Gen: NAD  Abdomen: Soft, appropriate post-op tenderness, non- distended , firm uterine fundus at umbilicus.  Incision: Clean, dry, and intact with steri strips  Pelvic: Normal lochia noted  Ext: No calf tenderness    LABS:                        8.4    6.1   )-----------( 177      ( 19 Jan 2019 06:38 )             24.4                         8.4    7.0   )-----------( 152      ( 18 Jan 2019 06:54 )             24.7       01-19-19 @ 06:38      139  |  105  |  10  ----------------------------<  78  3.8   |  21<L>  |  0.59        Ca    8.7      19 Jan 2019 06:38    TPro  5.6<L>  /  Alb  3.0<L>  /  TBili  0.1<L>  /  DBili  x   /  AST  16  /  ALT  16  /  AlkPhos  117  01-19-19 @ 06:38

## 2019-01-20 NOTE — CHART NOTE - NSCHARTNOTEFT_GEN_A_CORE
Pt seen for severe range BPs. Asymptomatic at this time, including no blurred vision, epigastric pain, headaches, CP, SOB.    VS  T(C): 36.3 (01-20-19 @ 21:37)  HR: 66 (01-20-19 @ 21:37)  BP: 161/87 (01-20-19 @ 21:37)  RR: 18 (01-20-19 @ 21:37)  SpO2: 99% (01-20-19 @ 21:37)    Gen: NAD    P:  - 10 mg IV hydralazine given  - 30 mg of Procardia XL given now, plan to continue as 90 mg in AM and 30 mg in evening moving forward  - c/w labetalol 400 mg TID  - HELLP labs drawn now, if still wnl, no need for Mg    d/w Dr. Leo Edwards, PGY-3

## 2019-01-21 LAB
ALBUMIN SERPL ELPH-MCNC: 3.9 G/DL — SIGNIFICANT CHANGE UP (ref 3.3–5)
ALP SERPL-CCNC: 132 U/L — HIGH (ref 40–120)
ALT FLD-CCNC: 16 U/L — SIGNIFICANT CHANGE UP (ref 10–45)
ANION GAP SERPL CALC-SCNC: 12 MMOL/L — SIGNIFICANT CHANGE UP (ref 5–17)
APTT BLD: 31.2 SEC — SIGNIFICANT CHANGE UP (ref 27.5–36.3)
AST SERPL-CCNC: 15 U/L — SIGNIFICANT CHANGE UP (ref 10–40)
BILIRUB SERPL-MCNC: 0.1 MG/DL — LOW (ref 0.2–1.2)
BUN SERPL-MCNC: 14 MG/DL — SIGNIFICANT CHANGE UP (ref 7–23)
CALCIUM SERPL-MCNC: 9.1 MG/DL — SIGNIFICANT CHANGE UP (ref 8.4–10.5)
CHLORIDE SERPL-SCNC: 105 MMOL/L — SIGNIFICANT CHANGE UP (ref 96–108)
CO2 SERPL-SCNC: 20 MMOL/L — LOW (ref 22–31)
CREAT SERPL-MCNC: 0.6 MG/DL — SIGNIFICANT CHANGE UP (ref 0.5–1.3)
FIBRINOGEN PPP-MCNC: 788 MG/DL — HIGH (ref 350–510)
GLUCOSE SERPL-MCNC: 93 MG/DL — SIGNIFICANT CHANGE UP (ref 70–99)
INR BLD: 0.92 RATIO — SIGNIFICANT CHANGE UP (ref 0.88–1.16)
LDH SERPL L TO P-CCNC: 247 U/L — HIGH (ref 50–242)
POTASSIUM SERPL-MCNC: 4.4 MMOL/L — SIGNIFICANT CHANGE UP (ref 3.5–5.3)
POTASSIUM SERPL-SCNC: 4.4 MMOL/L — SIGNIFICANT CHANGE UP (ref 3.5–5.3)
PROT SERPL-MCNC: 6.7 G/DL — SIGNIFICANT CHANGE UP (ref 6–8.3)
PROTHROM AB SERPL-ACNC: 10.5 SEC — SIGNIFICANT CHANGE UP (ref 10–12.9)
SODIUM SERPL-SCNC: 137 MMOL/L — SIGNIFICANT CHANGE UP (ref 135–145)
URATE SERPL-MCNC: 4.4 MG/DL — SIGNIFICANT CHANGE UP (ref 2.5–7)

## 2019-01-21 RX ORDER — LABETALOL HCL 100 MG
400 TABLET ORAL EVERY 6 HOURS
Qty: 0 | Refills: 0 | Status: DISCONTINUED | OUTPATIENT
Start: 2019-01-21 | End: 2019-01-22

## 2019-01-21 RX ADMIN — Medication 400 MILLIGRAM(S): at 05:15

## 2019-01-21 RX ADMIN — Medication 600 MILLIGRAM(S): at 23:28

## 2019-01-21 RX ADMIN — Medication 600 MILLIGRAM(S): at 17:08

## 2019-01-21 RX ADMIN — Medication 600 MILLIGRAM(S): at 11:46

## 2019-01-21 RX ADMIN — Medication 975 MILLIGRAM(S): at 17:11

## 2019-01-21 RX ADMIN — Medication 975 MILLIGRAM(S): at 17:09

## 2019-01-21 RX ADMIN — Medication 325 MILLIGRAM(S): at 17:08

## 2019-01-21 RX ADMIN — Medication 600 MILLIGRAM(S): at 06:30

## 2019-01-21 RX ADMIN — Medication 400 MILLIGRAM(S): at 22:58

## 2019-01-21 RX ADMIN — Medication 90 MILLIGRAM(S): at 06:30

## 2019-01-21 RX ADMIN — FAMOTIDINE 20 MILLIGRAM(S): 10 INJECTION INTRAVENOUS at 17:08

## 2019-01-21 RX ADMIN — Medication 400 MILLIGRAM(S): at 13:59

## 2019-01-21 RX ADMIN — Medication 975 MILLIGRAM(S): at 22:58

## 2019-01-21 RX ADMIN — Medication 975 MILLIGRAM(S): at 00:15

## 2019-01-21 RX ADMIN — Medication 30 MILLIGRAM(S): at 17:07

## 2019-01-21 RX ADMIN — Medication 600 MILLIGRAM(S): at 11:47

## 2019-01-21 RX ADMIN — Medication 600 MILLIGRAM(S): at 05:15

## 2019-01-21 RX ADMIN — Medication 600 MILLIGRAM(S): at 17:11

## 2019-01-21 RX ADMIN — Medication 600 MILLIGRAM(S): at 22:58

## 2019-01-21 RX ADMIN — Medication 100 MILLIGRAM(S): at 17:08

## 2019-01-21 RX ADMIN — Medication 1 TABLET(S): at 11:46

## 2019-01-21 RX ADMIN — Medication 975 MILLIGRAM(S): at 11:47

## 2019-01-21 RX ADMIN — Medication 325 MILLIGRAM(S): at 05:14

## 2019-01-21 RX ADMIN — SERTRALINE 50 MILLIGRAM(S): 25 TABLET, FILM COATED ORAL at 08:19

## 2019-01-21 RX ADMIN — Medication 100 MILLIGRAM(S): at 05:14

## 2019-01-21 RX ADMIN — Medication 400 MILLIGRAM(S): at 17:08

## 2019-01-21 RX ADMIN — Medication 975 MILLIGRAM(S): at 11:46

## 2019-01-21 NOTE — PROGRESS NOTE ADULT - SUBJECTIVE AND OBJECTIVE BOX
Patient seen and examined at bedside, no acute overnight events. No acute complaints, pain well controlled. Patient is ambulating, voiding spontaneously, passing flatus, and tolerating regular diet. Denies HA, CP, SOB, blurred vision, epigastric pain.    Vital Signs Last 24 Hours  T(C): 36.3 (01-20-19 @ 21:37), Max: 37.1 (01-20-19 @ 05:22)  HR: 74 (01-20-19 @ 23:05) (66 - 82)  BP: 146/81 (01-20-19 @ 23:05) (130/76 - 169/90)  RR: 18 (01-20-19 @ 21:37) (18 - 18)  SpO2: 99% (01-20-19 @ 21:37) (96% - 99%)    Physical Exam:  General: NAD  Abdomen: Soft, non-tender, non-distended, fundus firm  Incision: Pfannenstiel incision CDI, subcuticular suture closure  Pelvic: Lochia wnl    Labs:    Blood Type: B Positive  Antibody Screen: Negative  RPR: Negative               10.0   8.0   )-----------( 252      ( 01-20 @ 23:43 )             29.7                8.4    6.1   )-----------( 177      ( 01-19 @ 06:38 )             24.4                8.4    7.0   )-----------( 152      ( 01-18 @ 06:54 )             24.7         MEDICATIONS  (STANDING):  acetaminophen   Tablet .. 975 milliGRAM(s) Oral every 6 hours  aspirin  chewable 81 milliGRAM(s) Oral daily  diphenhydrAMINE   Injectable 25 milliGRAM(s) IV Push once  diphtheria/tetanus/pertussis (acellular) Vaccine (ADAcel) 0.5 milliLiter(s) IntraMuscular once  docusate sodium 100 milliGRAM(s) Oral two times a day  famotidine    Tablet 20 milliGRAM(s) Oral two times a day  ferrous    sulfate 325 milliGRAM(s) Oral two times a day  heparin  Injectable 5000 Unit(s) SubCutaneous every 12 hours  ibuprofen  Tablet. 600 milliGRAM(s) Oral every 6 hours  labetalol 400 milliGRAM(s) Oral every 8 hours  magnesium sulfate Infusion 1.5 Gm/Hr (37.5 mL/Hr) IV Continuous <Continuous>  misoprostol Oral Solution 40 MICROGram(s) Oral every 2 hours  misoprostol Oral Solution 60 MICROGram(s) Oral every 2 hours  NIFEdipine XL 90 milliGRAM(s) Oral every 24 hours  NIFEdipine XL 30 milliGRAM(s) Oral every 24 hours  oxyCODONE    IR 5 milliGRAM(s) Oral every 3 hours  oxytocin Infusion 333.333 milliUNIT(s)/Min (1000 mL/Hr) IV Continuous <Continuous>  oxytocin Infusion 41.667 milliUNIT(s)/Min (125 mL/Hr) IV Continuous <Continuous>  prenatal multivitamin 1 Tablet(s) Oral daily  sertraline 50 milliGRAM(s) Oral daily    MEDICATIONS  (PRN):  acetaminophen   Tablet .. 975 milliGRAM(s) Oral every 6 hours PRN Mild Pain (1 - 3)  diphenhydrAMINE 25 milliGRAM(s) Oral every 6 hours PRN Itching  glycerin Suppository - Adult 1 Suppository(s) Rectal at bedtime PRN Constipation  lanolin Ointment 1 Application(s) Topical every 3 hours PRN Sore Nipples  oxyCODONE    IR 5 milliGRAM(s) Oral every 4 hours PRN Severe Pain (7 - 10)  simethicone 80 milliGRAM(s) Chew every 4 hours PRN Gas  sodium chloride 0.65% Nasal 1 Spray(s) Both Nostrils every 3 hours PRN Nasal Congestion

## 2019-01-21 NOTE — PROGRESS NOTE ADULT - PROBLEM SELECTOR PLAN 2
procardia XL 90 mg am and procardia 30mg pm  Labetalol 400 mg q 6H.  This will hopefully keep BP controlled without any more spikes.

## 2019-01-21 NOTE — PROGRESS NOTE ADULT - SUBJECTIVE AND OBJECTIVE BOX
Section/Postpartum  TORRIE BARKSDALE is a  38y woman , who is now post-operative day: 6  PAST MEDICAL & SURGICAL HISTORY:    Subjective:  The patient feels well. No problems with headache of abdominal discomfort.  She is ambulating.   She is tolerating regular diet.  She denies nausea and vomiting.  She is voiding.  Her pain is controlled.  She reports normal postpartum bleeding.  She is breastfeeding.    Physical exam:    Vital Signs Last 24 Hrs  T(C): 36.3 (2019 13:52), Max: 36.9 (2019 04:17)  T(F): 97.3 (2019 13:52), Max: 98.5 (2019 04:17)  HR: 60 (2019 13:52) (60 - 82)  BP: 156/88 (2019 13:52) (125/67 - 169/90)  BP(mean): --  RR: 18 (2019 13:52) (18 - 18)  SpO2: 98% (2019 04:17) (98% - 99%)    General: alert and oriented in no acute distress.  Breast: Soft, nontender, not engorged.  Abdomen: Soft, nontender, not distended ,  Uterine fundus is firm and at below the umbilicus, BS (+), Flatus (+), Bowel Movement (+)  Incision: Clean, dry, and intact, bandage has been removed  Pelvic: Normal lochia noted  Ext: No calf tenderness  Lochia: not excessive    LABS:                        10.0   8.0   )-----------( 252      ( 2019 23:43 )             29.7       Rubella status: Immune     Blood Type:  Type + Screen (19 @ 16:18)    ABO Interpretation: B    Rh Interpretation: Positive    Antibody Screen: Negative                      Allergies    penicillin (Rash)    Intolerances      MEDICATIONS  (STANDING):  acetaminophen   Tablet .. 975 milliGRAM(s) Oral every 6 hours  aspirin  chewable 81 milliGRAM(s) Oral daily  diphenhydrAMINE   Injectable 25 milliGRAM(s) IV Push once  diphtheria/tetanus/pertussis (acellular) Vaccine (ADAcel) 0.5 milliLiter(s) IntraMuscular once  docusate sodium 100 milliGRAM(s) Oral two times a day  famotidine    Tablet 20 milliGRAM(s) Oral two times a day  ferrous    sulfate 325 milliGRAM(s) Oral two times a day  heparin  Injectable 5000 Unit(s) SubCutaneous every 12 hours  ibuprofen  Tablet. 600 milliGRAM(s) Oral every 6 hours  labetalol 400 milliGRAM(s) Oral every 8 hours  magnesium sulfate Infusion 1.5 Gm/Hr (37.5 mL/Hr) IV Continuous <Continuous>  misoprostol Oral Solution 40 MICROGram(s) Oral every 2 hours  misoprostol Oral Solution 60 MICROGram(s) Oral every 2 hours  NIFEdipine XL 90 milliGRAM(s) Oral every 24 hours  NIFEdipine XL 30 milliGRAM(s) Oral every 24 hours  oxyCODONE    IR 5 milliGRAM(s) Oral every 3 hours  oxytocin Infusion 333.333 milliUNIT(s)/Min (1000 mL/Hr) IV Continuous <Continuous>  oxytocin Infusion 41.667 milliUNIT(s)/Min (125 mL/Hr) IV Continuous <Continuous>  prenatal multivitamin 1 Tablet(s) Oral daily  sertraline 50 milliGRAM(s) Oral daily    MEDICATIONS  (PRN):  acetaminophen   Tablet .. 975 milliGRAM(s) Oral every 6 hours PRN Mild Pain (1 - 3)  diphenhydrAMINE 25 milliGRAM(s) Oral every 6 hours PRN Itching  glycerin Suppository - Adult 1 Suppository(s) Rectal at bedtime PRN Constipation  lanolin Ointment 1 Application(s) Topical every 3 hours PRN Sore Nipples  oxyCODONE    IR 5 milliGRAM(s) Oral every 4 hours PRN Severe Pain (7 - 10)  simethicone 80 milliGRAM(s) Chew every 4 hours PRN Gas  sodium chloride 0.65% Nasal 1 Spray(s) Both Nostrils every 3 hours PRN Nasal Congestion        Assessment and Plan  POD #  6 s/p    PCS with postpartum preeclampsia on Procardia XL 90 mg am and procardia XL 30 mg pm and Labetalol 400mg q 8H    Doing well.  Encourage ambulation, may shower, routine postop care

## 2019-01-21 NOTE — PROGRESS NOTE ADULT - PROBLEM SELECTOR PLAN 1
-Continue to monitor BPs and Symptoms  -c/w Labetalol 400 TID and Procardia XL 90 in AM and 30 at night  -cw Zoloft for anxiety  -Continue with regular diet  -Heparin, SCDs, and ambulation for DVT ppx  -Analgesia as needed    Domo Edwards, PGY-3  Pager #[phone] ([facility]), [phone] ([place])

## 2019-01-21 NOTE — PROGRESS NOTE ADULT - ASSESSMENT
37 yo  POD#6 sp pLTCS for arrest with sPEC and di/di TIUP.  Patient is now s/p magnesium for seizure prophylaxis and remains asymptomatic.  Blood pressures were severely elevated overnight, responded to IV hydralazine, and are now well-controlled on Labetalol 400 TID and Procardia 90XL in the AM and 30 XL at night.  Patient is hemodynamically stable with pain well-controlled.

## 2019-01-22 VITALS — HEART RATE: 78 BPM | SYSTOLIC BLOOD PRESSURE: 154 MMHG | DIASTOLIC BLOOD PRESSURE: 88 MMHG

## 2019-01-22 PROCEDURE — 85027 COMPLETE CBC AUTOMATED: CPT

## 2019-01-22 PROCEDURE — 86900 BLOOD TYPING SEROLOGIC ABO: CPT

## 2019-01-22 PROCEDURE — 86850 RBC ANTIBODY SCREEN: CPT

## 2019-01-22 PROCEDURE — 86780 TREPONEMA PALLIDUM: CPT

## 2019-01-22 PROCEDURE — G0463: CPT

## 2019-01-22 PROCEDURE — 59025 FETAL NON-STRESS TEST: CPT

## 2019-01-22 PROCEDURE — 59050 FETAL MONITOR W/REPORT: CPT

## 2019-01-22 PROCEDURE — 85610 PROTHROMBIN TIME: CPT

## 2019-01-22 PROCEDURE — C1765: CPT

## 2019-01-22 PROCEDURE — 99254 IP/OBS CNSLTJ NEW/EST MOD 60: CPT | Mod: GC

## 2019-01-22 PROCEDURE — 83735 ASSAY OF MAGNESIUM: CPT

## 2019-01-22 PROCEDURE — 88307 TISSUE EXAM BY PATHOLOGIST: CPT

## 2019-01-22 PROCEDURE — 84550 ASSAY OF BLOOD/URIC ACID: CPT

## 2019-01-22 PROCEDURE — 83615 LACTATE (LD) (LDH) ENZYME: CPT

## 2019-01-22 PROCEDURE — 90715 TDAP VACCINE 7 YRS/> IM: CPT

## 2019-01-22 PROCEDURE — 85384 FIBRINOGEN ACTIVITY: CPT

## 2019-01-22 PROCEDURE — 85730 THROMBOPLASTIN TIME PARTIAL: CPT

## 2019-01-22 PROCEDURE — 86923 COMPATIBILITY TEST ELECTRIC: CPT

## 2019-01-22 PROCEDURE — 86901 BLOOD TYPING SEROLOGIC RH(D): CPT

## 2019-01-22 PROCEDURE — 80053 COMPREHEN METABOLIC PANEL: CPT

## 2019-01-22 RX ORDER — ASPIRIN/CALCIUM CARB/MAGNESIUM 324 MG
1 TABLET ORAL
Qty: 0 | Refills: 0 | COMMUNITY

## 2019-01-22 RX ORDER — ACETAMINOPHEN 500 MG
3 TABLET ORAL
Qty: 0 | Refills: 0 | COMMUNITY
Start: 2019-01-22

## 2019-01-22 RX ORDER — NIFEDIPINE 30 MG
1 TABLET, EXTENDED RELEASE 24 HR ORAL
Qty: 14 | Refills: 0 | OUTPATIENT
Start: 2019-01-22 | End: 2019-02-04

## 2019-01-22 RX ORDER — TETANUS TOXOID, REDUCED DIPHTHERIA TOXOID AND ACELLULAR PERTUSSIS VACCINE, ADSORBED 5; 2.5; 8; 8; 2.5 [IU]/.5ML; [IU]/.5ML; UG/.5ML; UG/.5ML; UG/.5ML
0.5 SUSPENSION INTRAMUSCULAR ONCE
Qty: 0 | Refills: 0 | Status: COMPLETED | OUTPATIENT
Start: 2019-01-22 | End: 2019-01-22

## 2019-01-22 RX ORDER — IBUPROFEN 200 MG
1 TABLET ORAL
Qty: 0 | Refills: 0 | COMMUNITY
Start: 2019-01-22

## 2019-01-22 RX ORDER — SERTRALINE 25 MG/1
1 TABLET, FILM COATED ORAL
Qty: 60 | Refills: 0 | OUTPATIENT
Start: 2019-01-22 | End: 2019-03-22

## 2019-01-22 RX ORDER — DOCUSATE SODIUM 100 MG
1 CAPSULE ORAL
Qty: 60 | Refills: 0 | OUTPATIENT
Start: 2019-01-22 | End: 2019-02-20

## 2019-01-22 RX ORDER — NIFEDIPINE 30 MG
1 TABLET, EXTENDED RELEASE 24 HR ORAL
Qty: 14 | Refills: 1 | OUTPATIENT
Start: 2019-01-22 | End: 2019-02-18

## 2019-01-22 RX ORDER — LABETALOL HCL 100 MG
3 TABLET ORAL
Qty: 270 | Refills: 1 | OUTPATIENT
Start: 2019-01-22 | End: 2019-03-22

## 2019-01-22 RX ADMIN — Medication 90 MILLIGRAM(S): at 06:25

## 2019-01-22 RX ADMIN — Medication 325 MILLIGRAM(S): at 17:59

## 2019-01-22 RX ADMIN — Medication 100 MILLIGRAM(S): at 06:25

## 2019-01-22 RX ADMIN — TETANUS TOXOID, REDUCED DIPHTHERIA TOXOID AND ACELLULAR PERTUSSIS VACCINE, ADSORBED 0.5 MILLILITER(S): 5; 2.5; 8; 8; 2.5 SUSPENSION INTRAMUSCULAR at 14:29

## 2019-01-22 RX ADMIN — Medication 600 MILLIGRAM(S): at 12:33

## 2019-01-22 RX ADMIN — Medication 600 MILLIGRAM(S): at 06:44

## 2019-01-22 RX ADMIN — Medication 100 MILLIGRAM(S): at 18:02

## 2019-01-22 RX ADMIN — Medication 600 MILLIGRAM(S): at 06:24

## 2019-01-22 RX ADMIN — Medication 400 MILLIGRAM(S): at 12:33

## 2019-01-22 RX ADMIN — Medication 30 MILLIGRAM(S): at 17:58

## 2019-01-22 RX ADMIN — Medication 975 MILLIGRAM(S): at 18:50

## 2019-01-22 RX ADMIN — Medication 325 MILLIGRAM(S): at 06:24

## 2019-01-22 RX ADMIN — Medication 975 MILLIGRAM(S): at 17:58

## 2019-01-22 RX ADMIN — Medication 400 MILLIGRAM(S): at 17:58

## 2019-01-22 RX ADMIN — FAMOTIDINE 20 MILLIGRAM(S): 10 INJECTION INTRAVENOUS at 06:25

## 2019-01-22 RX ADMIN — Medication 975 MILLIGRAM(S): at 06:24

## 2019-01-22 RX ADMIN — SERTRALINE 50 MILLIGRAM(S): 25 TABLET, FILM COATED ORAL at 08:18

## 2019-01-22 RX ADMIN — Medication 1 TABLET(S): at 12:32

## 2019-01-22 RX ADMIN — Medication 400 MILLIGRAM(S): at 06:25

## 2019-01-22 RX ADMIN — Medication 600 MILLIGRAM(S): at 13:00

## 2019-01-22 RX ADMIN — Medication 975 MILLIGRAM(S): at 13:00

## 2019-01-22 RX ADMIN — Medication 975 MILLIGRAM(S): at 06:44

## 2019-01-22 RX ADMIN — Medication 975 MILLIGRAM(S): at 12:32

## 2019-01-22 NOTE — PROVIDER CONTACT NOTE (OTHER) - RECOMMENDATIONS
Give labetalol po 400mg as ordered.
Give the Procardia 30mg XL and Labetalol 400mg po now as scheduled
Repeat in an hour
400mg labetalol administered as ordered, recheck bp.
Antihypertensive adjustment?
BPs increased, Scheduled 300 mg Labetalol given
labetalol 400mg po 4xday due 2300, give early?

## 2019-01-22 NOTE — PROVIDER CONTACT NOTE (OTHER) - DATE AND TIME:
16-Jan-2019 21:00
17-Jan-2019 05:10
20-Jan-2019 21:38
22-Jan-2019 12:30
21-Jan-2019 21:55
19-Jan-2019 21:20
22-Jan-2019 17:55

## 2019-01-22 NOTE — PROVIDER CONTACT NOTE (OTHER) - SITUATION
C/S day 5 s/p mg for PEC. Pt on labetalol 400mg TID and procardia 90xl daily.
Pt /80
Pt /91 Manual rpt 152/90
patient's blood pressure is 163/90
PP C/S day 4 s/p mg for PEC. Pt on labetalol 400mg TID and procardia 60xl daily.

## 2019-01-22 NOTE — PROVIDER CONTACT NOTE (OTHER) - BACKGROUND
C/S twin delivery s/p Mag
C/S twin delivery s/p Mag day 2
c/s day 6 s/p mg, labetalol 400mg po 4xday, procardia 30xl daily, procardia 90 xl daily

## 2019-01-22 NOTE — PROGRESS NOTE ADULT - SUBJECTIVE AND OBJECTIVE BOX
Patient seen and examined at bedside, no acute overnight events. No acute complaints, pain well controlled. Patient is ambulating, voiding spontaneously, passing flatus, and tolerating regular diet. Denies CP, SOB, N/V, HA, blurred vision, epigastric pain.    Vital Signs Last 24 Hours  T(C): 36.8 (01-22-19 @ 01:26), Max: 36.8 (01-22-19 @ 01:26)  HR: 74 (01-22-19 @ 01:26) (60 - 74)  BP: 119/68 (01-22-19 @ 01:26) (119/68 - 167/93)  RR: 16 (01-22-19 @ 01:26) (16 - 18)  SpO2: 98% (01-21-19 @ 16:58) (98% - 98%)    Physical Exam:  General: NAD  Abdomen: Soft, non-tender, non-distended, fundus firm  Incision: Pfannenstiel incision CDI, subcuticular suture closure  Pelvic: Lochia wnl    Blood Type: B Positive  Antibody Screen: Negative  RPR: Negative               10.0   8.0   )-----------( 252      ( 01-20 @ 23:43 )             29.7                8.4    6.1   )-----------( 177      ( 01-19 @ 06:38 )             24.4                8.4    7.0   )-----------( 152      ( 01-18 @ 06:54 )             24.7         MEDICATIONS  (STANDING):  acetaminophen   Tablet .. 975 milliGRAM(s) Oral every 6 hours  aspirin  chewable 81 milliGRAM(s) Oral daily  diphenhydrAMINE   Injectable 25 milliGRAM(s) IV Push once  diphtheria/tetanus/pertussis (acellular) Vaccine (ADAcel) 0.5 milliLiter(s) IntraMuscular once  docusate sodium 100 milliGRAM(s) Oral two times a day  famotidine    Tablet 20 milliGRAM(s) Oral two times a day  ferrous    sulfate 325 milliGRAM(s) Oral two times a day  heparin  Injectable 5000 Unit(s) SubCutaneous every 12 hours  ibuprofen  Tablet. 600 milliGRAM(s) Oral every 6 hours  labetalol 400 milliGRAM(s) Oral every 6 hours  magnesium sulfate Infusion 1.5 Gm/Hr (37.5 mL/Hr) IV Continuous <Continuous>  misoprostol Oral Solution 40 MICROGram(s) Oral every 2 hours  misoprostol Oral Solution 60 MICROGram(s) Oral every 2 hours  NIFEdipine XL 90 milliGRAM(s) Oral every 24 hours  NIFEdipine XL 30 milliGRAM(s) Oral every 24 hours  oxyCODONE    IR 5 milliGRAM(s) Oral every 3 hours  oxytocin Infusion 333.333 milliUNIT(s)/Min (1000 mL/Hr) IV Continuous <Continuous>  oxytocin Infusion 41.667 milliUNIT(s)/Min (125 mL/Hr) IV Continuous <Continuous>  prenatal multivitamin 1 Tablet(s) Oral daily  sertraline 50 milliGRAM(s) Oral daily    MEDICATIONS  (PRN):  acetaminophen   Tablet .. 975 milliGRAM(s) Oral every 6 hours PRN Mild Pain (1 - 3)  diphenhydrAMINE 25 milliGRAM(s) Oral every 6 hours PRN Itching  glycerin Suppository - Adult 1 Suppository(s) Rectal at bedtime PRN Constipation  lanolin Ointment 1 Application(s) Topical every 3 hours PRN Sore Nipples  oxyCODONE    IR 5 milliGRAM(s) Oral every 4 hours PRN Severe Pain (7 - 10)  simethicone 80 milliGRAM(s) Chew every 4 hours PRN Gas  sodium chloride 0.65% Nasal 1 Spray(s) Both Nostrils every 3 hours PRN Nasal Congestion

## 2019-01-22 NOTE — CONSULT NOTE ADULT - SUBJECTIVE AND OBJECTIVE BOX
Date of Admission:    Patient is a 38y old  Female who presents with a chief complaint of preeclampsia (2019 16:31)      HISTORY OF PRESENT ILLNESS: 38F with no history of hypertension reports high blood pressures since 2 weeks prior to delivery of twins. She had high BP up to 160's when seen at her OB's office, which improved with rest and laying down. She was not started on any medications and reported only sometimes feeling headaches at home that improved with Tylenol, but no other symptoms of high BP. On the day of delivery, she was seen at her OB's office and found to have a BP of 196/-, which did not improve and she was sent for  delivery of her pregnancy. After delivery, her BPs were elevated intermittently to the 160s and she was started on labetalol and nifedipine which have been increased now to labetalol 400 [id number] and nifedipine 90 AM and 30qhs. During one hypertensive episode to the 180's, she was administered hydralazine intravenously, and had a rash on her chest and face afterwards, that again occurred during another dose later. She reports no history of heart disease or hypertension prior this pregnancy, was not taking medication other than baby aspirin for pre-eclampsia. She does state that over 10 years ago, she had her heart evaluated for findings of a "crooked valve" but was reassured of no consequences from the finding. She denied having a history of regurgitation, and reported she had an ECHO done at that time.     Allergies    penicillin (Rash)    Intolerances    	    MEDICATIONS:  aspirin  chewable 81 milliGRAM(s) Oral daily  heparin  Injectable 5000 Unit(s) SubCutaneous every 12 hours  labetalol 400 milliGRAM(s) Oral every 6 hours  NIFEdipine XL 90 milliGRAM(s) Oral every 24 hours  NIFEdipine XL 30 milliGRAM(s) Oral every 24 hours      diphenhydrAMINE   Injectable 25 milliGRAM(s) IV Push once    acetaminophen   Tablet .. 975 milliGRAM(s) Oral every 6 hours PRN  acetaminophen   Tablet .. 975 milliGRAM(s) Oral every 6 hours  diphenhydrAMINE 25 milliGRAM(s) Oral every 6 hours PRN  ibuprofen  Tablet. 600 milliGRAM(s) Oral every 6 hours  magnesium sulfate Infusion 1.5 Gm/Hr IV Continuous <Continuous>  oxyCODONE    IR 5 milliGRAM(s) Oral every 4 hours PRN  oxyCODONE    IR 5 milliGRAM(s) Oral every 3 hours  sertraline 50 milliGRAM(s) Oral daily    docusate sodium 100 milliGRAM(s) Oral two times a day  famotidine    Tablet 20 milliGRAM(s) Oral two times a day  glycerin Suppository - Adult 1 Suppository(s) Rectal at bedtime PRN  misoprostol Oral Solution 40 MICROGram(s) Oral every 2 hours  misoprostol Oral Solution 60 MICROGram(s) Oral every 2 hours  simethicone 80 milliGRAM(s) Chew every 4 hours PRN      diphtheria/tetanus/pertussis (acellular) Vaccine (ADAcel) 0.5 milliLiter(s) IntraMuscular once  ferrous    sulfate 325 milliGRAM(s) Oral two times a day  lanolin Ointment 1 Application(s) Topical every 3 hours PRN  oxytocin Infusion 333.333 milliUNIT(s)/Min IV Continuous <Continuous>  oxytocin Infusion 41.667 milliUNIT(s)/Min IV Continuous <Continuous>  prenatal multivitamin 1 Tablet(s) Oral daily  sodium chloride 0.65% Nasal 1 Spray(s) Both Nostrils every 3 hours PRN      PAST MEDICAL & SURGICAL HISTORY:      FAMILY HISTORY:      SOCIAL HISTORY:    [ ] Non-smoker  [ ] Smoker  [ ] Alcohol      REVIEW OF SYSTEMS:    CONSTITUTIONAL: No weakness, fevers or chills  EYES/ENT: No visual changes;  No dysphagia  NECK: No pain or stiffness  RESPIRATORY: No cough, wheezing, hemoptysis; No shortness of breath  CARDIOVASCULAR: No chest pain or palpitations; No lower extremity edema  GASTROINTESTINAL: No abdominal or epigastric pain. No nausea, vomiting, or hematemesis; No diarrhea or constipation. No melena or hematochezia.  BACK: No back pain  GENITOURINARY: No dysuria, frequency or hematuria  NEUROLOGICAL: No numbness or weakness  SKIN: No itching, burning, rashes, or lesions   All other review of systems is negative unless indicated above.  PHYSICAL EXAM:  T(C): 36.3 (19 @ 09:42), Max: 36.8 (19 @ 01:26)  HR: 78 (19 @ 09:42) (60 - 78)  BP: 122/67 (19 @ 09:42) (119/68 - 167/93)  RR: 18 (19 @ 09:42) (16 - 18)  SpO2: 98% (19 @ 09:42) (98% - 98%)  Wt(kg): --  I&O's Summary      Appearance: Normal	  HEENT:   Normal oral mucosa, PERRL, EOMI	  Lymphatic: No lymphadenopathy  Cardiovascular: Normal S1 S2, No JVD, No murmurs, No edema  Respiratory: Lungs clear to auscultation	  Psychiatry: A & O x 3, Mood & affect appropriate  Gastrointestinal:  Soft, Non-tender, + BS	  Skin: No rashes, No ecchymoses, No cyanosis	  Neurologic: Non-focal  Extremities: Normal range of motion, No clubbing, cyanosis or edema  Vascular: Peripheral pulses palpable 2+ bilaterally        LABS:	 	    CBC Full  -  ( 2019 23:43 )  WBC Count : 8.0 K/uL  Hemoglobin : 10.0 g/dL  Hematocrit : 29.7 %  Platelet Count - Automated : 252 K/uL  Mean Cell Volume : 90.1 fl  Mean Cell Hemoglobin : 30.2 pg  Mean Cell Hemoglobin Concentration : 33.5 gm/dL  Auto Neutrophil # : 5.1 K/uL  Auto Lymphocyte # : 2.1 K/uL  Auto Monocyte # : 0.6 K/uL  Auto Eosinophil # : 0.2 K/uL  Auto Basophil # : 0.0 K/uL  Auto Neutrophil % : 63.2 %  Auto Lymphocyte % : 26.4 %  Auto Monocyte % : 8.0 %  Auto Eosinophil % : 2.0 %  Auto Basophil % : 0.4 %        137  |  105  |  14  ----------------------------<  93  4.4   |  20<L>  |  0.60    Ca    9.1      2019 23:43    TPro  6.7  /  Alb  3.9  /  TBili  0.1<L>  /  DBili  x   /  AST  15  /  ALT  16  /  AlkPhos  132<H>        proBNP:   Lipid Profile:   HgA1c:   TSH:       CARDIAC MARKERS:            TELEMETRY: 	    ECG:  	  RADIOLOGY:  OTHER: 	    PREVIOUS DIAGNOSTIC TESTING:    [ ] Echocardiogram:  [ ]  Catheterization:  [ ] Stress Test: Date of Admission:    Patient is a 38y old  Female who presents with a chief complaint of preeclampsia (2019 16:31)      HISTORY OF PRESENT ILLNESS: 38F with no history of hypertension reports high blood pressures since 2 weeks prior to delivery of twins. She had high BP up to 160's when seen at her OB's office, which improved with rest and laying down. She was not started on any medications and reported only sometimes feeling headaches at home that improved with Tylenol, but no other symptoms of high BP. On the day of delivery, she was seen at her OB's office and found to have a BP of 196/-, which did not improve and she was sent for  delivery of her pregnancy. After delivery, her BPs were elevated intermittently to the 160s and she was started on labetalol and nifedipine which have been increased now to labetalol 400 [id number] and nifedipine 90 AM and 30qhs. During one hypertensive episode to the 180's, she was administered hydralazine intravenously, and had a rash on her chest and face afterwards, that again occurred during another dose later. She reports no history of heart disease or hypertension prior this pregnancy, was not taking medication other than baby aspirin for pre-eclampsia. She does state that over 10 years ago, she had her heart evaluated for findings of a "crooked valve" but was reassured of no consequences from the finding. She denied having a history of regurgitation, and reported she had an ECHO done at that time.     Allergies    penicillin (Rash)    Intolerances    	    MEDICATIONS:  aspirin  chewable 81 milliGRAM(s) Oral daily  heparin  Injectable 5000 Unit(s) SubCutaneous every 12 hours  labetalol 400 milliGRAM(s) Oral every 6 hours  NIFEdipine XL 90 milliGRAM(s) Oral every 24 hours  NIFEdipine XL 30 milliGRAM(s) Oral every 24 hours      diphenhydrAMINE   Injectable 25 milliGRAM(s) IV Push once    acetaminophen   Tablet .. 975 milliGRAM(s) Oral every 6 hours PRN  acetaminophen   Tablet .. 975 milliGRAM(s) Oral every 6 hours  diphenhydrAMINE 25 milliGRAM(s) Oral every 6 hours PRN  ibuprofen  Tablet. 600 milliGRAM(s) Oral every 6 hours  magnesium sulfate Infusion 1.5 Gm/Hr IV Continuous <Continuous>  oxyCODONE    IR 5 milliGRAM(s) Oral every 4 hours PRN  oxyCODONE    IR 5 milliGRAM(s) Oral every 3 hours  sertraline 50 milliGRAM(s) Oral daily    docusate sodium 100 milliGRAM(s) Oral two times a day  famotidine    Tablet 20 milliGRAM(s) Oral two times a day  glycerin Suppository - Adult 1 Suppository(s) Rectal at bedtime PRN  misoprostol Oral Solution 40 MICROGram(s) Oral every 2 hours  misoprostol Oral Solution 60 MICROGram(s) Oral every 2 hours  simethicone 80 milliGRAM(s) Chew every 4 hours PRN      diphtheria/tetanus/pertussis (acellular) Vaccine (ADAcel) 0.5 milliLiter(s) IntraMuscular once  ferrous    sulfate 325 milliGRAM(s) Oral two times a day  lanolin Ointment 1 Application(s) Topical every 3 hours PRN  oxytocin Infusion 333.333 milliUNIT(s)/Min IV Continuous <Continuous>  oxytocin Infusion 41.667 milliUNIT(s)/Min IV Continuous <Continuous>  prenatal multivitamin 1 Tablet(s) Oral daily  sodium chloride 0.65% Nasal 1 Spray(s) Both Nostrils every 3 hours PRN      PAST MEDICAL & SURGICAL HISTORY:  Reports no pmh or sx history besides recent     FAMILY HISTORY:  pre-eclampsia: sister  Father and mother: diabetes    SOCIAL HISTORY:    [X] Non-smoker since 3 years, prev social, infreq  [ ] Smoker  [X] Alcohol - social, infreq      REVIEW OF SYSTEMS:  denies ha, cp, dizziness, lightheadedness, vision changes    CONSTITUTIONAL: No weakness, fevers or chills  EYES/ENT: No visual changes;  No dysphagia  NECK: No pain or stiffness  RESPIRATORY: No cough, wheezing, hemoptysis; No shortness of breath  CARDIOVASCULAR: No chest pain or palpitations; No lower extremity edema  GASTROINTESTINAL: No abdominal or epigastric pain. No nausea, vomiting, or hematemesis; No diarrhea or constipation. No melena or hematochezia.  BACK: No back pain  GENITOURINARY: No dysuria, frequency or hematuria  NEUROLOGICAL: No numbness or weakness  SKIN: No itching, burning, rashes, or lesions   All other review of systems is negative unless indicated above.  PHYSICAL EXAM:  T(C): 36.3 (19 @ 09:42), Max: 36.8 (19 @ 01:26)  HR: 78 (19 @ 09:42) (60 - 78)  BP: 122/67 (19 @ 09:42) (119/68 - 167/93)  RR: 18 (19 @ 09:42) (16 - 18)  SpO2: 98% (19 @ 09:42) (98% - 98%)  Wt(kg): --  I&O's Summary      Appearance: Normal	  HEENT:   Normal oral mucosa, PERRL, EOMI	  Lymphatic: No lymphadenopathy  Cardiovascular: Normal S1 S2, No JVD, No murmurs, No edema  Respiratory: Lungs clear to auscultation	  Psychiatry: A & O x 3, Mood & affect appropriate  Gastrointestinal:  Soft, Non-tender, + BS	  Skin: No rashes, No ecchymoses, No cyanosis	  Neurologic: Non-focal  Extremities: Normal range of motion, No clubbing, cyanosis or edema  Vascular: Peripheral pulses palpable 2+ bilaterally        LABS:	 	    CBC Full  -  ( 2019 23:43 )  WBC Count : 8.0 K/uL  Hemoglobin : 10.0 g/dL  Hematocrit : 29.7 %  Platelet Count - Automated : 252 K/uL  Mean Cell Volume : 90.1 fl  Mean Cell Hemoglobin : 30.2 pg  Mean Cell Hemoglobin Concentration : 33.5 gm/dL  Auto Neutrophil # : 5.1 K/uL  Auto Lymphocyte # : 2.1 K/uL  Auto Monocyte # : 0.6 K/uL  Auto Eosinophil # : 0.2 K/uL  Auto Basophil # : 0.0 K/uL  Auto Neutrophil % : 63.2 %  Auto Lymphocyte % : 26.4 %  Auto Monocyte % : 8.0 %  Auto Eosinophil % : 2.0 %  Auto Basophil % : 0.4 %        137  |  105  |  14  ----------------------------<  93  4.4   |  20<L>  |  0.60    Ca    9.1      2019 23:43    TPro  6.7  /  Alb  3.9  /  TBili  0.1<L>  /  DBili  x   /  AST  15  /  ALT  16  /  AlkPhos  132<H>        proBNP:   Lipid Profile:   HgA1c:   TSH:       CARDIAC MARKERS:            TELEMETRY: 	    ECG:  	  RADIOLOGY:  OTHER: 	    PREVIOUS DIAGNOSTIC TESTING:    [ ] Echocardiogram:  [ ]  Catheterization:  [ ] Stress Test:

## 2019-01-22 NOTE — PROVIDER CONTACT NOTE (OTHER) - REASON
Elevated BP
Pt with /83 at 1229, pt asymptomatic
elevated blood pressure
Elevated BP
Pt with BPs of 163/77, 162/81, pt denies any s/s of eelvated BPs

## 2019-01-22 NOTE — CONSULT NOTE ADULT - ASSESSMENT
38F with no history of HTN, presents with pre-eclampsia in the post-partum period, currently improving on 400 labetalol [id number] and nifedipine 90 AM, 30qhs.     Plan to be discussed with attending/fellow. 38F with no history of HTN, presents with pre-eclampsia in the post-partum period, currently improving on 400 labetalol [id number] and nifedipine 90 AM, 30qhs.     #preeclampsia  -no symptoms of headache, chest pain, vision changes  -adjust labetalol dosing to 600mg TID, and continue with nifedipine 90 AM and 30 qhs  -may discharge on adjusted regimen with outpatient cardiology f/u within 1 week  -return to ED if symptoms of chest pain, etc arise

## 2019-01-22 NOTE — PROVIDER CONTACT NOTE (OTHER) - NAME OF MD/NP/PA/DO NOTIFIED:
Domo Edwards
Dr. Rodriguez
RYLAND Hollis MD
RYLAND Hollis MD
Dr. Edwards
Dr. Bailey at bedside
Lindsey Crump

## 2019-01-22 NOTE — PROVIDER CONTACT NOTE (OTHER) - ASSESSMENT
Pt denies any s/s of elevated BPs.
/87. Pt denies HA, dizziness, blurred vision, or epigastric pain. HR, temp, RR, spo2 WDL. Pt alert and oriented x4.
Pt denies dizziness, visual changes, or epigastric pain. Pt complains that prior   HA is beginning to return.
Pt denies s/s HA, dizziness, visual changes, or epigastric pain.
/100 manually auscultated with stethoscope.   HR, RR, temp WDL.   Pt denies HA, dizziness, blurred vision, or epigastric pain.
patient is asymptomatic

## 2019-01-22 NOTE — PROGRESS NOTE ADULT - ASSESSMENT
37 yo  POD#7 sp pLTCS for arrest with sPEC and di/di TIUP.  Patient is now s/p magnesium for seizure prophylaxis and remains asymptomatic.  Blood pressures were severely elevated overnight, responded to IV hydralazine, and are now well-controlled on Labetalol 400 QID and Procardia 90XL in the AM and 30 XL at night.  Patient is hemodynamically stable with pain well-controlled.

## 2019-01-22 NOTE — PROGRESS NOTE ADULT - REASON FOR ADMISSION
sPEC s/p pLTCS
sPEC/delivery
sPEC/delivery
Preeclampsia

## 2019-01-22 NOTE — PROGRESS NOTE ADULT - SUBJECTIVE AND OBJECTIVE BOX
Section/Postpartum  TORRIE BARKSDALE is a  38y woman  , who is now post-operative day 3.  PAST MEDICAL & SURGICAL HISTORY:    Subjective:  The patient feels well.  She is ambulating without difficulty.  She is tolerating PO.  She is voiding.  She denies nausea and vomiting.  Her pain is controlled.  She reports normal postpartum bleeding  She is breastfeeding.  She is formula feeding.    Physical exam:    Vital Signs Last 24 Hrs  T(C): 36.7 (2019 17:54), Max: 36.8 (2019 01:26)  T(F): 98 (2019 17:54), Max: 98.3 (2019 01:26)  HR: 65 (2019 17:55) (64 - 78)  BP: 162/81 (2019 17:55) (119/68 - 167/93)  BP(mean): --  RR: 18 (2019 17:54) (16 - 18)  SpO2: 97% (2019 17:54) (97% - 98%)    General Apperance: alert and oriented in no acute distress  Breast: Soft, nontender, non engorged  Abdomen: Soft, nontender, not distended,  Uterine fundus is firm and at umbilicus, BS(+), Flatus(+), Bowel Movement (+)  Incision: Clean, dry, and intact   Pelvic: Normal lochia noted  Ext: No calf tenderness, No edema or cyanosis  Lochia: not excessive    LABS:                        10.0   8.0   )-----------( 252      ( 2019 23:43 )             29.7     Blood Type: Type + Screen (19 @ 16:18)    ABO Interpretation: B    Rh Interpretation: Positive    Antibody Screen: Negative      Rubella status:  Immune    Allergies    penicillin (Rash)    Intolerances        MEDICATIONS  (STANDING):  acetaminophen   Tablet .. 975 milliGRAM(s) Oral every 6 hours  aspirin  chewable 81 milliGRAM(s) Oral daily  diphenhydrAMINE   Injectable 25 milliGRAM(s) IV Push once  docusate sodium 100 milliGRAM(s) Oral two times a day  famotidine    Tablet 20 milliGRAM(s) Oral two times a day  ferrous    sulfate 325 milliGRAM(s) Oral two times a day  heparin  Injectable 5000 Unit(s) SubCutaneous every 12 hours  ibuprofen  Tablet. 600 milliGRAM(s) Oral every 6 hours  labetalol 400 milliGRAM(s) Oral every 6 hours  magnesium sulfate Infusion 1.5 Gm/Hr (37.5 mL/Hr) IV Continuous <Continuous>  misoprostol Oral Solution 40 MICROGram(s) Oral every 2 hours  misoprostol Oral Solution 60 MICROGram(s) Oral every 2 hours  NIFEdipine XL 90 milliGRAM(s) Oral every 24 hours  NIFEdipine XL 30 milliGRAM(s) Oral every 24 hours  oxyCODONE    IR 5 milliGRAM(s) Oral every 3 hours  oxytocin Infusion 333.333 milliUNIT(s)/Min (1000 mL/Hr) IV Continuous <Continuous>  oxytocin Infusion 41.667 milliUNIT(s)/Min (125 mL/Hr) IV Continuous <Continuous>  prenatal multivitamin 1 Tablet(s) Oral daily  sertraline 50 milliGRAM(s) Oral daily    MEDICATIONS  (PRN):  acetaminophen   Tablet .. 975 milliGRAM(s) Oral every 6 hours PRN Mild Pain (1 - 3)  diphenhydrAMINE 25 milliGRAM(s) Oral every 6 hours PRN Itching  glycerin Suppository - Adult 1 Suppository(s) Rectal at bedtime PRN Constipation  lanolin Ointment 1 Application(s) Topical every 3 hours PRN Sore Nipples  oxyCODONE    IR 5 milliGRAM(s) Oral every 4 hours PRN Severe Pain (7 - 10)  simethicone 80 milliGRAM(s) Chew every 4 hours PRN Gas  sodium chloride 0.65% Nasal 1 Spray(s) Both Nostrils every 3 hours PRN Nasal Congestion        Assessment and Plan  POD # 7 S/P PCS       Doing well.  Encourage ambulation.  Discharge home today.  Paient to take ibuprofen and/or Tylenol for pain  Follow up in 1-2 week(s) for incision check, TORRIE BARKSDALE is to call the office for an appointment.  To use abdominal binder while awake as needed  Verbal discharge instructions d/w patient  - discharge summary to be given to her on discharge for the hospital.  Call for fevers, chills, nausea, vomiting, heavy vaginal bleeding, vaginal discharge, severe pain, symptoms of depression, problems with incision or any other concerning symptoms.  Nothing in vagina and no heavy lifting for  6-8 weeks. No sex!  No driving x 2 weeks.   Patient to continue with prenatal vitamins.

## 2019-01-22 NOTE — PROGRESS NOTE ADULT - PROBLEM SELECTOR PLAN 1
-Continue to monitor BPs and Symptoms  -c/w Labetalol 400 QID and Procardia XL 90 in AM and 30 at night  -cw Zoloft for anxiety  -Continue with regular diet  -Heparin, SCDs, and ambulation for DVT ppx  -Analgesia as needed    Domo Edwards, PGY-3  Pager #[phone] ([facility]), [phone] ([place])

## 2019-01-24 ENCOUNTER — APPOINTMENT (OUTPATIENT)
Dept: ANTEPARTUM | Facility: CLINIC | Age: 39
End: 2019-01-24

## 2019-04-11 ENCOUNTER — APPOINTMENT (OUTPATIENT)
Dept: DERMATOLOGY | Facility: CLINIC | Age: 39
End: 2019-04-11
Payer: COMMERCIAL

## 2019-04-11 VITALS — BODY MASS INDEX: 28.12 KG/M2 | WEIGHT: 175 LBS | HEIGHT: 66 IN

## 2019-04-11 DIAGNOSIS — Z84.0 FAMILY HISTORY OF DISEASES OF THE SKIN AND SUBCUTANEOUS TISSUE: ICD-10-CM

## 2019-04-11 DIAGNOSIS — Z91.89 OTHER SPECIFIED PERSONAL RISK FACTORS, NOT ELSEWHERE CLASSIFIED: ICD-10-CM

## 2019-04-11 DIAGNOSIS — Z77.22 CONTACT WITH AND (SUSPECTED) EXPOSURE TO ENVIRONMENTAL TOBACCO SMOKE (ACUTE) (CHRONIC): ICD-10-CM

## 2019-04-11 DIAGNOSIS — L40.9 PSORIASIS, UNSPECIFIED: ICD-10-CM

## 2019-04-11 DIAGNOSIS — L30.9 DERMATITIS, UNSPECIFIED: ICD-10-CM

## 2019-04-11 PROCEDURE — 99203 OFFICE O/P NEW LOW 30 MIN: CPT

## 2019-04-25 ENCOUNTER — APPOINTMENT (OUTPATIENT)
Dept: DERMATOLOGY | Facility: CLINIC | Age: 39
End: 2019-04-25
Payer: COMMERCIAL

## 2019-04-25 PROCEDURE — 99213 OFFICE O/P EST LOW 20 MIN: CPT

## 2019-04-25 NOTE — HISTORY OF PRESENT ILLNESS
[FreeTextEntry1] : f/u dyshidrotic eczema [de-identified] : Interval Hx: Notes significant improvement of her rash. Using clobetasol infrequently. Moisturizing with Cerave cream/ointment and also using liquid bandage as needed. Pleased with improvement. \par \par Hx of rash: (taken from 4/11/2019 note)\par Patient is a 38 y.o F who presents for evaluation of a hand rash x 10 years. Very itchy. Intermittent. Improves with pregnancy. Burns with hand washing. 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 Moisturizes with aveeno lotion. Tried Taclonex without improvement.

## 2019-04-25 NOTE — PHYSICAL EXAM
[Alert] : alert [Well Nourished] : well nourished [Oriented x 3] : ~L oriented x 3 [Conjunctiva Non-injected] : conjunctiva non-injected [No Visual Lymphadenopathy] : no visual  lymphadenopathy [No Clubbing] : no clubbing [No Bromhidrosis] : no bromhidrosis [No Edema] : no edema [No Chromhidrosis] : no chromhidrosis [FreeTextEntry3] : Few erythematous scaly patches on digits

## 2019-10-17 ENCOUNTER — APPOINTMENT (OUTPATIENT)
Dept: DERMATOLOGY | Facility: CLINIC | Age: 39
End: 2019-10-17

## 2020-07-17 NOTE — PROVIDER CONTACT NOTE (OTHER) - ACTION/TREATMENT ORDERED:
Action: LSW received an email from Param Valentin with [facility] who stated that baby is clear to discharge home with MOB upon medical clearance.     Barriers to Discharge: None    Plan: No further social work needs at this time.     
repeat blood pressure in 15 minutes, repeat 153/89, labetalol 400mg po given at 2258
MD Crump made aware of bp. Administer 400mg labetalol po as ordered and re-check BP in 1 hour as per MD Crump. No changes in orders needed at this time. Continue to monitor pt.
as above
as recommended
MD Edwards made aware. Re-check BP in 20 minutes as per MD Edwards. No changes in orders needed at this time.
Rpt BP in an 1hr 30 mins per Bunny MATHEW, will cont. to monitor.
Rpt BP in an 1hr per Bunny MATHEW, will cont. to monitor.

## 2021-03-22 ENCOUNTER — APPOINTMENT (OUTPATIENT)
Dept: DERMATOLOGY | Facility: CLINIC | Age: 41
End: 2021-03-22
Payer: COMMERCIAL

## 2021-03-22 ENCOUNTER — LABORATORY RESULT (OUTPATIENT)
Age: 41
End: 2021-03-22

## 2021-03-22 DIAGNOSIS — D48.5 NEOPLASM OF UNCERTAIN BEHAVIOR OF SKIN: ICD-10-CM

## 2021-03-22 DIAGNOSIS — L30.9 DERMATITIS, UNSPECIFIED: ICD-10-CM

## 2021-03-22 PROCEDURE — 11104 PUNCH BX SKIN SINGLE LESION: CPT

## 2021-03-22 PROCEDURE — 99072 ADDL SUPL MATRL&STAF TM PHE: CPT

## 2021-03-22 PROCEDURE — 99214 OFFICE O/P EST MOD 30 MIN: CPT | Mod: 25

## 2021-03-30 ENCOUNTER — NON-APPOINTMENT (OUTPATIENT)
Age: 41
End: 2021-03-30

## 2021-04-05 ENCOUNTER — APPOINTMENT (OUTPATIENT)
Dept: DERMATOLOGY | Facility: CLINIC | Age: 41
End: 2021-04-05
Payer: COMMERCIAL

## 2021-04-05 PROCEDURE — 99212 OFFICE O/P EST SF 10 MIN: CPT

## 2021-04-05 PROCEDURE — 99072 ADDL SUPL MATRL&STAF TM PHE: CPT

## 2021-04-05 NOTE — PHYSICAL EXAM
[Alert] : alert [Oriented x 3] : ~L oriented x 3 [Well Nourished] : well nourished [Conjunctiva Non-injected] : conjunctiva non-injected [No Visual Lymphadenopathy] : no visual  lymphadenopathy [No Clubbing] : no clubbing [No Edema] : no edema [No Bromhidrosis] : no bromhidrosis [No Chromhidrosis] : no chromhidrosis [FreeTextEntry3] : Well-healed scar no the R upper chest

## 2021-04-05 NOTE — HISTORY OF PRESENT ILLNESS
[FreeTextEntry1] : f/u dermatofibroma [de-identified] : 40F with a hx of nevi here in f/u of bx proven dermatofibroma. Bx'ed at [facility]

## 2022-08-01 ENCOUNTER — APPOINTMENT (OUTPATIENT)
Dept: DERMATOLOGY | Facility: CLINIC | Age: 42
End: 2022-08-01

## 2022-08-01 DIAGNOSIS — L73.9 FOLLICULAR DISORDER, UNSPECIFIED: ICD-10-CM

## 2022-08-01 DIAGNOSIS — D23.9 OTHER BENIGN NEOPLASM OF SKIN, UNSPECIFIED: ICD-10-CM

## 2022-08-01 PROCEDURE — 99214 OFFICE O/P EST MOD 30 MIN: CPT

## 2023-09-14 ENCOUNTER — APPOINTMENT (OUTPATIENT)
Dept: DERMATOLOGY | Facility: CLINIC | Age: 43
End: 2023-09-14
Payer: COMMERCIAL

## 2023-09-14 DIAGNOSIS — D22.9 MELANOCYTIC NEVI, UNSPECIFIED: ICD-10-CM

## 2023-09-14 DIAGNOSIS — L30.1 DYSHIDROSIS [POMPHOLYX]: ICD-10-CM

## 2023-09-14 DIAGNOSIS — Z12.83 ENCOUNTER FOR SCREENING FOR MALIGNANT NEOPLASM OF SKIN: ICD-10-CM

## 2023-09-14 PROCEDURE — 99213 OFFICE O/P EST LOW 20 MIN: CPT

## 2023-09-14 RX ORDER — CLOBETASOL PROPIONATE 0.5 MG/G
0.05 OINTMENT TOPICAL
Qty: 1 | Refills: 2 | Status: ACTIVE | COMMUNITY
Start: 2019-04-11 | End: 1900-01-01

## 2024-11-11 ENCOUNTER — NON-APPOINTMENT (OUTPATIENT)
Age: 44
End: 2024-11-11

## 2024-11-11 ENCOUNTER — APPOINTMENT (OUTPATIENT)
Dept: DERMATOLOGY | Facility: CLINIC | Age: 44
End: 2024-11-11
Payer: COMMERCIAL

## 2024-11-11 ENCOUNTER — LABORATORY RESULT (OUTPATIENT)
Age: 44
End: 2024-11-11

## 2024-11-11 VITALS — BODY MASS INDEX: 35.36 KG/M2 | WEIGHT: 220 LBS | HEIGHT: 66 IN

## 2024-11-11 DIAGNOSIS — Z12.83 ENCOUNTER FOR SCREENING FOR MALIGNANT NEOPLASM OF SKIN: ICD-10-CM

## 2024-11-11 DIAGNOSIS — D48.5 NEOPLASM OF UNCERTAIN BEHAVIOR OF SKIN: ICD-10-CM

## 2024-11-11 DIAGNOSIS — L30.1 DYSHIDROSIS [POMPHOLYX]: ICD-10-CM

## 2024-11-11 DIAGNOSIS — D22.9 MELANOCYTIC NEVI, UNSPECIFIED: ICD-10-CM

## 2024-11-11 PROCEDURE — 11102 TANGNTL BX SKIN SINGLE LES: CPT

## 2024-11-11 PROCEDURE — 99213 OFFICE O/P EST LOW 20 MIN: CPT | Mod: 25

## 2025-07-12 ENCOUNTER — NON-APPOINTMENT (OUTPATIENT)
Age: 45
End: 2025-07-12

## 2025-07-14 ENCOUNTER — APPOINTMENT (OUTPATIENT)
Dept: DERMATOLOGY | Facility: CLINIC | Age: 45
End: 2025-07-14
Payer: COMMERCIAL

## 2025-07-14 PROCEDURE — 99213 OFFICE O/P EST LOW 20 MIN: CPT
